# Patient Record
Sex: MALE | Race: WHITE
[De-identification: names, ages, dates, MRNs, and addresses within clinical notes are randomized per-mention and may not be internally consistent; named-entity substitution may affect disease eponyms.]

---

## 2017-11-12 ENCOUNTER — HOSPITAL ENCOUNTER (OUTPATIENT)
Dept: HOSPITAL 42 - ED | Age: 57
Setting detail: OBSERVATION
LOS: 2 days | Discharge: HOME | End: 2017-11-14
Attending: INTERNAL MEDICINE | Admitting: HOSPITALIST
Payer: COMMERCIAL

## 2017-11-12 VITALS — BODY MASS INDEX: 36.6 KG/M2

## 2017-11-12 DIAGNOSIS — Z80.0: ICD-10-CM

## 2017-11-12 DIAGNOSIS — I25.2: ICD-10-CM

## 2017-11-12 DIAGNOSIS — I10: ICD-10-CM

## 2017-11-12 DIAGNOSIS — I21.4: Primary | ICD-10-CM

## 2017-11-12 DIAGNOSIS — E11.51: ICD-10-CM

## 2017-11-12 DIAGNOSIS — T82.855A: ICD-10-CM

## 2017-11-12 DIAGNOSIS — Z91.14: ICD-10-CM
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
## 2017-11-12 DIAGNOSIS — Z87.891: ICD-10-CM

## 2017-11-12 DIAGNOSIS — I25.10: ICD-10-CM

## 2017-11-12 LAB
ALBUMIN/GLOB SERPL: 1.1 {RATIO} (ref 1.1–1.8)
ALP SERPL-CCNC: 115 U/L (ref 38–126)
ALT SERPL-CCNC: 70 U/L (ref 7–56)
AST SERPL-CCNC: 43 U/L (ref 17–59)
BASOPHILS # BLD AUTO: 0.03 K/MM3 (ref 0–2)
BASOPHILS NFR BLD: 0.3 % (ref 0–3)
BILIRUB SERPL-MCNC: 0.9 MG/DL (ref 0.2–1.3)
BUN SERPL-MCNC: 18 MG/DL (ref 7–21)
CALCIUM SERPL-MCNC: 9.4 MG/DL (ref 8.4–10.5)
CHLORIDE SERPL-SCNC: 106 MMOL/L (ref 98–107)
CO2 SERPL-SCNC: 25 MMOL/L (ref 21–33)
EOSINOPHIL # BLD: 0.3 10*3/UL (ref 0–0.7)
EOSINOPHIL NFR BLD: 2.6 % (ref 1.5–5)
ERYTHROCYTE [DISTWIDTH] IN BLOOD BY AUTOMATED COUNT: 13.5 % (ref 11.5–14.5)
GLOBULIN SER-MCNC: 3.9 GM/DL
GLUCOSE SERPL-MCNC: 145 MG/DL (ref 70–110)
GRANULOCYTES # BLD: 5.26 10*3/UL (ref 1.4–6.5)
GRANULOCYTES NFR BLD: 55.2 % (ref 50–68)
HCT VFR BLD CALC: 46.8 % (ref 42–52)
LIPASE SERPL-CCNC: 127 U/L (ref 23–300)
LYMPHOCYTES # BLD: 3.3 10*3/UL (ref 1.2–3.4)
LYMPHOCYTES NFR BLD AUTO: 34.1 % (ref 22–35)
MCH RBC QN AUTO: 30.2 PG (ref 25–35)
MCHC RBC AUTO-ENTMCNC: 34.2 G/DL (ref 31–37)
MCV RBC AUTO: 88.3 FL (ref 80–105)
MONOCYTES # BLD AUTO: 0.7 10*3/UL (ref 0.1–0.6)
MONOCYTES NFR BLD: 7.8 % (ref 1–6)
PLATELET # BLD: 207 10^3/UL (ref 120–450)
PMV BLD AUTO: 9.5 FL (ref 7–11)
POTASSIUM SERPL-SCNC: 4.2 MMOL/L (ref 3.6–5)
PROT SERPL-MCNC: 8.1 G/DL (ref 5.8–8.3)
SODIUM SERPL-SCNC: 141 MMOL/L (ref 132–148)
WBC # BLD AUTO: 9.5 10^3/UL (ref 4.5–11)

## 2017-11-12 PROCEDURE — 83615 LACTATE (LD) (LDH) ENZYME: CPT

## 2017-11-12 PROCEDURE — 93005 ELECTROCARDIOGRAM TRACING: CPT

## 2017-11-12 PROCEDURE — 90471 IMMUNIZATION ADMIN: CPT

## 2017-11-12 PROCEDURE — 84484 ASSAY OF TROPONIN QUANT: CPT

## 2017-11-12 PROCEDURE — 82553 CREATINE MB FRACTION: CPT

## 2017-11-12 PROCEDURE — 83735 ASSAY OF MAGNESIUM: CPT

## 2017-11-12 PROCEDURE — 82550 ASSAY OF CK (CPK): CPT

## 2017-11-12 PROCEDURE — 83036 HEMOGLOBIN GLYCOSYLATED A1C: CPT

## 2017-11-12 PROCEDURE — 83690 ASSAY OF LIPASE: CPT

## 2017-11-12 PROCEDURE — 93306 TTE W/DOPPLER COMPLETE: CPT

## 2017-11-12 PROCEDURE — 85025 COMPLETE CBC W/AUTO DIFF WBC: CPT

## 2017-11-12 PROCEDURE — 36415 COLL VENOUS BLD VENIPUNCTURE: CPT

## 2017-11-12 PROCEDURE — 80061 LIPID PANEL: CPT

## 2017-11-12 PROCEDURE — 83880 ASSAY OF NATRIURETIC PEPTIDE: CPT

## 2017-11-12 PROCEDURE — 85175 BLOOD CLOT LYSIS TIME: CPT

## 2017-11-12 PROCEDURE — 84443 ASSAY THYROID STIM HORMONE: CPT

## 2017-11-12 PROCEDURE — 84100 ASSAY OF PHOSPHORUS: CPT

## 2017-11-12 PROCEDURE — 90674 CCIIV4 VAC NO PRSV 0.5 ML IM: CPT

## 2017-11-12 PROCEDURE — 85730 THROMBOPLASTIN TIME PARTIAL: CPT

## 2017-11-12 PROCEDURE — 71010: CPT

## 2017-11-12 PROCEDURE — 99285 EMERGENCY DEPT VISIT HI MDM: CPT

## 2017-11-12 PROCEDURE — 99153 MOD SED SAME PHYS/QHP EA: CPT

## 2017-11-12 PROCEDURE — 99152 MOD SED SAME PHYS/QHP 5/>YRS: CPT

## 2017-11-12 PROCEDURE — 93458 L HRT ARTERY/VENTRICLE ANGIO: CPT

## 2017-11-12 PROCEDURE — 80053 COMPREHEN METABOLIC PANEL: CPT

## 2017-11-12 PROCEDURE — 85610 PROTHROMBIN TIME: CPT

## 2017-11-12 NOTE — ED PDOC
Arrival/HPI





- General


Historian: Patient


EM Caveat: Acuity of Condition





- History of Present Illness


Time/Duration: Prior to Arrival, 1-3 hours


Symptom Onset: Gradual


Symptom Course: Unchanged


Quality: Aching, Pressure


Severity Level: 8


Activities at Onset: Light


Context: Exertion





<Darlyn Ivan - Last Filed: 11/13/17 00:50>





<Sharda Rubalcava - Last Filed: 11/13/17 01:39>





- General


Chief Complaint: Chest Pain


Time Seen by Provider: 11/12/17 22:51





- History of Present Illness


Narrative History of Present Illness (Text): 





11/12/17 23:35


57 years old male with hx of CAD s/p 2 stents (2015), DM, noncompliant with meds

, presents for constant midsternal chest pain x past 3 hours. Pt states he has 

been having intermittent cp for past 2 weeks. He describes it as pressure, 

lasting for few mins at a time, rates it as 8/10, associated with exertional 

activities like bicycling, has radiation to right jaw, and associated sob and 

diaphoresis. Denies trauma, abdominal pain, nausea, vomiting, palpitations, 

syncope, cough, headache, fever, chills, diarrhea, constipation, urinary 

symptoms, claudication. Pt has no PCP and has been noncompliant with his meds 

for over a year since he cannot afford them. 


 (Darlyn Ivan)


Associated Symptoms (Text): 





11/12/17 23:51


Denies trauma, abdominal pain, nausea, vomiting, palpitations, syncope, cough, 

headache, fever, chills, diarrhea, constipation, urinary symptoms, claudication.

 (Darlyn Ivan)





Past Medical History





- Provider Review


Nursing Documentation Reviewed: Yes





- Infectious Disease


Hx of Infectious Diseases: None





- Cardiac


Hx Hypertension: Yes


Hx Peripheral Vascular Disease: Yes


Other/Comment: coronary stent





- Pulmonary


Hx Respiratory Disorders: No





- Neurological


Hx Neurological Disorder: No





- HEENT


Hx HEENT Disorder: No





- Renal


Hx Renal Disorder: No





- Endocrine/Metabolic


Hx Endocrine Disorders: No


Hx Diabetes Mellitus Type 2: No





- Hematological/Oncological


Hx Blood Transfusions: No


Hx Blood Transfusion Reaction: No





- Integumentary


Hx Dermatological Disorder: No





- Musculoskeletal/Rheumatological


Hx Arthritis: Yes





- Gastrointestinal


Hx Gastrointestinal Disorders: No





- Genitourinary/Gynecological


Hx Genitourinary Disorders: No





- Psychiatric


Hx Psychophysiologic Disorder: No


Hx Substance Use: No





- Surgical History


Hx Cardiac Catheterization: Yes


Other/Comment: coronary stent





- Anesthesia


Hx Anesthesia: Yes


Hx Anesthesia Reactions: No


Hx Malignant Hyperthermia: No





- Suicidal Assessment


Feels Threatened In Home Enviroment: No





<Darlyn Ivan - Last Filed: 11/13/17 00:50>





Family/Social History





- Physician Review


Nursing Documentation Reviewed: Yes


Family/Social History: No Known Family HX


Smoking Status: Former Smoker


Hx Alcohol Use: Yes (social)


Frequency of alcohol use: Socially


Hx Substance Use: No





<Bautista Ivanoja - Last Filed: 11/13/17 00:50>





Allergies/Home Meds





<MarzenaDarlyn - Last Filed: 11/13/17 00:50>





<GiniSharda - Last Filed: 11/13/17 01:39>


Allergies/Adverse Reactions: 


Allergies





No Known Allergies Allergy (Verified 11/13/17 01:18)


 











Review of Systems





- Review of Systems


Constitutional: absent: Fatigue, Fevers


Eyes: absent: Vision Changes


ENT: absent: Hearing Changes


Respiratory: SOB.  absent: Cough


Cardiovascular: Chest Pain.  absent: Palpitations, Calf Pain, Syncope


Gastrointestinal: absent: Abdominal Pain, Constipation, Diarrhea, Nausea, 

Vomiting


Genitourinary Male: absent: Dysuria, Hematuria


Musculoskeletal: absent: Back Pain


Skin: absent: Rash


Neurological: absent: Headache, Dizziness


Endocrine: absent: Diaphoresis


Psychiatric: Anxiety





<Darlyn Ivan - Last Filed: 11/13/17 00:50>





Physical Exam


Vital Signs Reviewed: Yes


Temperature: Afebrile


Blood Pressure: Hypertensive


Pulse: Tachycardic


Respiratory Rate: Normal


Appearance: Positive for: Uncomfortable, Other (Anxious middle aged male)


Pain Distress: Moderate


Mental Status: Positive for: Alert and Oriented X 3





- Systems Exam


Head: Present: Atraumatic, Normocephalic


Pupils: Present: PERRL


Extroacular Muscles: Present: EOMI


Conjunctiva: Present: Normal


Mouth: Present: Moist Mucous Membranes


Pharnyx: No: ERYTHEMA


Respiratory/Chest: Present: Clear to Auscultation.  No: Respiratory Distress


Cardiovascular: Present: Normal S1, S2, Tachycardic


Abdomen: Present: Normal Bowel Sounds.  No: Tenderness, Distention


Upper Extremity: Present: Normal Inspection.  No: Edema


Lower Extremity: No: Edema, CALF TENDERNESS


Neurological: Present: GCS=15, Speech Normal


Skin: Present: Warm, Dry


Psychiatric: Present: Alert, Oriented x 3





<Darlyn Ivan - Last Filed: 11/13/17 00:50>


Vital Signs











  Temp Pulse Resp BP Pulse Ox


 


 11/12/17 23:55  98 F  87  16  128/74  100


 


 11/12/17 23:53   89   112/88 


 


 11/12/17 22:50  98.3 F  98 H  18  138/102 H  97














Medical Decision Making





- Lab Interpretations


I have reviewed the lab results: Yes


Interpretation: Abnormal lab values





- RAD Interpretation


: ED Physician





- EKG Interpretation


Interpreted by ED Physician: Yes


Type: 12 lead EKG


Comparison: Com.w/previous EKG





- Transfer of Care


Patient signed out to Dr:: Brii





<Darlyn Ivan - Last Filed: 11/13/17 00:50>





<Sharda Rubalcava - Last Filed: 11/13/17 01:39>


ED Course and Treatment: 





11/13/17 00:06


57 years old male with PMH CAD s/p 2 stents (2015), DM, noncompliant with meds, 

presents for chest pain:





- cardiac iso, ekg, cbc, cmp, lipase, mg, coags


- CXR


- ASA, lipitor, lopressor, nitrostat sublingual


- reassess





11/13/17 00:43


trop 0.03, ck mb 5.1, discussed with Dr Teresa. Will go to Telemetry for 

Observation. (Darlyn Ivan)


In agreement with resident note, which includes further HPI details. Patient 

was seen and evaluated with resident, came up with plan and treatment together.





11/13/17 01:35


Patient with noted history.  EKG is nondiagnostic and does not fit code heart 

criteria; first CE is 0.03; pain improved with nitro.  Patient with history of 

cad with stents so certainly high risk for acs - will need further evaluation 

and treatment and placement on tele.  Resident discussed with Dr. Teresa. (Sharda Rubalcava)





- Lab Interpretations


Narrative Lab Interpretation (Text): 





11/13/17 00:22


trop 0.03, CK MB 5.1, ALT 70, ,  (Darlyn Ivan)


Lab Results: 








 11/12/17 22:45 





 11/12/17 22:45 





 Lab Results





11/12/17 22:45: PT 11.9, INR 1.08, APTT 28.9


11/12/17 22:45: Sodium 141, Potassium 4.2, Chloride 106, Carbon Dioxide 25, 

Anion Gap 14, BUN 18, Creatinine 1.0, Est GFR (African Amer) > 60, Est GFR (Non-

Af Amer) > 60, Random Glucose 145 H, Calcium 9.4, Total Bilirubin 0.9, AST 43, 

ALT 70 H, Alkaline Phosphatase 115, Lactate Dehydrogenase 776 H, Total Creatine 

Kinase 292 H, CK-MB (CK-2) 5.1 H, CK-MB (CK-2) % 1.7 L, Troponin I 0.03  D, NT-

Pro-B Natriuret Pep 122, Total Protein 8.1, Albumin 4.2, Globulin 3.9, Albumin/

Globulin Ratio 1.1, Lipase 127


11/12/17 22:45: WBC 9.5  D, RBC 5.30, Hgb 16.0, Hct 46.8, MCV 88.3, MCH 30.2, 

MCHC 34.2, RDW 13.5, Plt Count 207, MPV 9.5, Gran % 55.2, Lymph % (Auto) 34.1, 

Mono % (Auto) 7.8 H, Eos % (Auto) 2.6, Baso % (Auto) 0.3, Gran # 5.26, Lymph # 

3.3, Mono # 0.7 H, Eos # 0.3, Baso # 0.03











- RAD Interpretation


Narrative RAD Interpretations (Text): 





11/13/17 00:42


CXR: no acute changes (Darlyn Ivan)


Radiology Orders: 








11/12/17 23:28


CHEST PORTABLE [RAD] Stat 














- EKG Interpretation


EKG Interpretation (Text): 





11/13/17 00:14


EKG: , No acute ST changes (Darlyn Ivan)





- Medication Orders


Current Medication Orders: 








Aspirin (Ecotrin)  81 mg PO DAILY TJ


Atorvastatin Calcium (Lipitor)  80 mg PO DAILY [facility]


Metoprolol Tartrate (Lopressor)  25 mg PO BID TJ





Discontinued Medications





Aspirin (Aspirin)  325 mg PO STAT STA


   Stop: 11/12/17 23:29


   Last Admin: 11/12/17 23:53  Dose: 325 mg





Atorvastatin Calcium (Lipitor)  40 mg PO STAT STA


   Stop: 11/12/17 23:32


   Last Admin: 11/12/17 23:53  Dose: 40 mg





Metoprolol Tartrate (Lopressor)  25 mg PO STAT STA


   Stop: 11/12/17 23:42


   Last Admin: 11/12/17 23:53  Dose: 25 mg





MAR Pulse and Blood Pressure


 Document     11/12/17 23:53  LAC  (Rec: 11/12/17 23:53  LAC  [facility] [place]-[id number])


     Pulse


      Pulse Rate (60-90)                         89


     Blood Pressure


      Blood Pressure (100//90)             112/88





Nitroglycerin (Nitrostat Sl Tab)  0.4 mg SL STAT STA


   Stop: 11/12/17 23:43


   Last Admin: 11/12/17 23:53  Dose: 0.4 mg











- PA / NP / Resident Statement


MD/DO has reviewed & agrees with the documentation as recorded.


MD/DO has examined the patient and agrees with the treatment plan.





<Darlyn Ivan - Last Filed: 11/13/17 00:50>





- PA / NP / Resident Statement


MD/ has reviewed & agrees with the documentation as recorded.


MD/DO has examined the patient and agrees with the treatment plan.





- Scribe Statement


The provider has reviewed the documentation as recorded by the Scribe





<Sharda Rubalcava - Last Filed: 11/13/17 01:39>





- Scribe Statement


Elmira Palomares





Provider Scribe Attestation:


All medical record entries made by the Scribe were at my direction and 

personally dictated by me. I have reviewed the chart and agree that the record 

accurately reflects my personal performance of the history, physical exam, 

medical decision making, and the department course for this patient. I have 

also personally directed, reviewed, and agree with the discharge instructions 

and disposition. (Sharda Rubalcava)





Disposition/Present on Arrival





- Present on Arrival


Any Indicators Present on Arrival: No


History of DVT/PE: No


History of Uncontrolled Diabetes: No


Urinary Catheter: No


History of Decub. Ulcer: No


History Surgical Site Infection Following: None





- Disposition


Have Diagnosis and Disposition been Completed?: Yes


Disposition Time: 00:43


Patient Plan: Observation, Telemetry





<Darlyn Ivan - Last Filed: 11/13/17 00:50>





<Sharda Rubalcava - Last Filed: 11/13/17 01:39>





- Disposition


Diagnosis: 


 Chest pain





Disposition: HOSPITALIZED


Patient Problems: 


 Current Active Problems











Problem Status Onset


 


Chest pain Acute  











Condition: FAIR

## 2017-11-13 VITALS — OXYGEN SATURATION: 100 %

## 2017-11-13 LAB
ALBUMIN/GLOB SERPL: 1 {RATIO} (ref 1.1–1.8)
ALP SERPL-CCNC: 111 U/L (ref 38–126)
ALT SERPL-CCNC: 67 U/L (ref 7–56)
APTT BLD: 28.9 SECONDS (ref 25.1–36.5)
AST SERPL-CCNC: 50 U/L (ref 17–59)
BASOPHILS # BLD AUTO: 0.03 K/MM3 (ref 0–2)
BASOPHILS NFR BLD: 0.4 % (ref 0–3)
BILIRUB SERPL-MCNC: 1 MG/DL (ref 0.2–1.3)
BUN SERPL-MCNC: 17 MG/DL (ref 7–21)
CALCIUM SERPL-MCNC: 8.9 MG/DL (ref 8.4–10.5)
CHLORIDE SERPL-SCNC: 106 MMOL/L (ref 98–107)
CHOLEST SERPL-MCNC: 158 MG/DL (ref 130–200)
CO2 SERPL-SCNC: 24 MMOL/L (ref 21–33)
EOSINOPHIL # BLD: 0.3 10*3/UL (ref 0–0.7)
EOSINOPHIL NFR BLD: 3.2 % (ref 1.5–5)
ERYTHROCYTE [DISTWIDTH] IN BLOOD BY AUTOMATED COUNT: 13.8 % (ref 11.5–14.5)
GLOBULIN SER-MCNC: 3.7 GM/DL
GLUCOSE SERPL-MCNC: 169 MG/DL (ref 70–110)
GRANULOCYTES # BLD: 3.95 10*3/UL (ref 1.4–6.5)
GRANULOCYTES NFR BLD: 51.1 % (ref 50–68)
HCT VFR BLD CALC: 44.7 % (ref 42–52)
INR PPP: 1.08 (ref 0.93–1.08)
LYMPHOCYTES # BLD: 2.8 10*3/UL (ref 1.2–3.4)
LYMPHOCYTES NFR BLD AUTO: 36.1 % (ref 22–35)
MAGNESIUM SERPL-MCNC: 1.9 MG/DL (ref 1.7–2.2)
MCH RBC QN AUTO: 29.3 PG (ref 25–35)
MCHC RBC AUTO-ENTMCNC: 32.7 G/DL (ref 31–37)
MCV RBC AUTO: 89.8 FL (ref 80–105)
MONOCYTES # BLD AUTO: 0.7 10*3/UL (ref 0.1–0.6)
MONOCYTES NFR BLD: 9.2 % (ref 1–6)
PHOSPHATE SERPL-MCNC: 2.9 MG/DL (ref 2.5–4.5)
PLATELET # BLD: 199 10^3/UL (ref 120–450)
PMV BLD AUTO: 10.2 FL (ref 7–11)
POTASSIUM SERPL-SCNC: 4.2 MMOL/L (ref 3.6–5)
PROT SERPL-MCNC: 7.5 G/DL (ref 5.8–8.3)
SODIUM SERPL-SCNC: 139 MMOL/L (ref 132–148)
TROPONIN I SERPL-MCNC: 0.03 NG/ML
TROPONIN I SERPL-MCNC: 1.11 NG/ML
TROPONIN I SERPL-MCNC: 1.58 NG/ML
WBC # BLD AUTO: 7.7 10^3/UL (ref 4.5–11)

## 2017-11-13 RX ADMIN — PANTOPRAZOLE SODIUM SCH MG: 40 TABLET, DELAYED RELEASE ORAL at 05:53

## 2017-11-13 NOTE — CARD
--------------- APPROVED REPORT --------------





EKG Measurement

Heart [id number]

[place] 160P15

[id number]

[id number]

[id number]



<Conclusion>

Sinus bradycardia

Inferior infarct, age undetermined

Abnormal ECG

## 2017-11-13 NOTE — CP.PCM.HP
History of Present Illness





- History of Present Illness


History of Present Illness: 


57-year-old male with past medical history of coronary artery disease with two 

stents, questionable diabetes and noncompliance with medications presents to 

the emergency room with chest pain. Patient states he was biking home from work 

around 8:30 PM today when he started feeling tightness in his chest. He 

describes the pain as 8 out of 10 which did not improve with rest. He stated 

the pain got worse when he got home and had to come into the hospital. Patient 

states the pain was located in the middle of his chest and also radiated to his 

left arm, jaw and mouth. Along with the pain, patient states that he began to 

sweat profusely. Patient did not take any medications to alleviate the pain. 

Patient states that the pain has decreased since since receiving pain 

medication in the emergency room. Patient denies any shortness of breath ,

nausea ,vomiting, abdominal pain, fevers,chills, or any other complaints. 





Primary Medical Doctor: none 


Past medical history: coronary artery disease status post two stents 


Past surgical history: varicose veins surgery


Allergies: denies 


Family History: DM and mother had gastric cancer 


Medications: none


Social: denies tobacco use. Consumes 6-10 beers a week, denies any illicit drug 

use 








Present on Admission





- Present on Admission


Any Indicators Present on Admission: No





Review of Systems





- Constitutional


Constitutional: Excessive Sweating.  absent: Chills, Fever





- EENT


Eyes: absent: Blurred Vision, Change in Vision


Ears: absent: Disequilibrium, Dizziness


Nose/Mouth/Throat: absent: Nasal Congestion, Nasal Discharge





- Cardiovascular


Cardiovascular: Chest Pain, Chest Pain with Activity, Pain Radiating to Arm/Neck

/Jaw, Radiating Pain.  absent: Dyspnea, Lightheadedness, Rapid Heart Rate, Slow 

Heart Rate





- Respiratory


Respiratory: absent: Cough, Dyspnea





- Gastrointestinal


Gastrointestinal: absent: Abdominal Pain, Constipation, Diarrhea, Nausea, 

Vomiting





- Genitourinary


Genitourinary: absent: Change in Urinary Stream, Difficulty Urinating





- Musculoskeletal


Musculoskeletal: absent: Arthralgias, Numbness, Tingling





- Integumentary


Integumentary: absent: Rash





- Neurological


Neurological: absent: Disequilibrium, Dizziness, Headaches, Syncope





Past Patient History





- Infectious Disease


Hx of Infectious Diseases: None





- Past Social History


Smoking Status: Former Smoker





- CARDIAC


Hx Hypertension: Yes


Hx Peripheral Vascular Disease: Yes


Other/Comment: coronary stent





- PULMONARY


Hx Respiratory Disorders: No





- NEUROLOGICAL


Hx Neurological Disorder: No





- HEENT


Hx HEENT Problems: No





- RENAL


Hx Chronic Kidney Disease: No





- ENDOCRINE/METABOLIC


Hx Endocrine Disorders: No


Hx Diabetes Mellitus Type 2: No





- HEMATOLOGICAL/ONCOLOGICAL


Hx Blood Transfusions: No


Hx Blood Transfusion Reaction: No





- INTEGUMENTARY


Hx Dermatological Problems: No





- MUSCULOSKELETAL/RHEUMATOLOGICAL


Hx Arthritis: Yes





- GASTROINTESTINAL


Hx Gastrointestinal Disorders: No





- GENITOURINARY/GYNECOLOGICAL


Hx Genitourinary Disorders: No





- PSYCHIATRIC


Hx Psychophysiologic Disorder: No


Hx Substance Use: No





- SURGICAL HISTORY


Hx Cardiac Catheterization: Yes


Other/Comment: coronary stent





- ANESTHESIA


Hx Anesthesia: Yes


Hx Anesthesia Reactions: No


Hx Malignant Hyperthermia: No





Meds


Allergies/Adverse Reactions: 


 Allergies











Allergy/AdvReac Type Severity Reaction Status Date / Time


 


No Known Allergies Allergy   Verified 11/13/17 01:18














Physical Exam





- Constitutional


Appears: Non-toxic, No Acute Distress





- Head Exam


Head Exam: ATRAUMATIC, NORMAL INSPECTION, NORMOCEPHALIC





- Eye Exam


Eye Exam: EOMI, Normal appearance


Pupil Exam: NORMAL ACCOMODATION, PERRL





- ENT Exam


ENT Exam: Mucous Membranes Moist, Normal Exam





- Neck Exam


Neck exam: Positive for: Normal Inspection





- Respiratory Exam


Respiratory Exam: Clear to Auscultation Bilateral, NORMAL BREATHING PATTERN





- Cardiovascular Exam


Cardiovascular Exam: REGULAR RHYTHM, +S1





- GI/Abdominal Exam


GI & Abdominal Exam: Normal Bowel Sounds





- Extremities Exam


Extremities exam: Positive for: normal inspection, pedal pulses present.  

Negative for: pedal edema, tenderness


Additional comments: 


veins prominent in lower extremeties, with signs of peripheral arty disease








- Neurological Exam


Neurological exam: Alert, Oriented x3





- Psychiatric Exam


Psychiatric exam: Normal Affect





- Skin


Skin Exam: Normal Color, Warm





Results





- Vital Signs


Recent Vital Signs: 





 Last Vital Signs











Temp  98 F   11/12/17 23:55


 


Pulse  87   11/12/17 23:55


 


Resp  16   11/12/17 23:55


 


BP  128/74   11/12/17 23:55


 


Pulse Ox  100   11/12/17 23:55














- Labs


Result Diagrams: 


 11/12/17 22:45





 11/12/17 22:45





Assessment & Plan





- Assessment and Plan (Free Text)


Assessment: 


57-year-old male with past medical history of coronary artery disease with two 

stents, questionable diabetes and noncompliance with medications presents to 

the emergency room with chest pain. 





Plan: 


1. Chest Pain- Rule out ACS


-EKG ordered and obtained, no ST changes, pending official read


-will obtain EKG in AM


-chest xray : no active disease, pending official read 


-CBC and CMP ordered and reviewed 


-intial tops: .03, will monitor serially with 2 more sets 


-CK: 292, CK-MB 5.1, will monitor 


-LDH: 776 


-Lipitor 80 mg 


-Aspirin 81mg


-Metoprolol 25 BID, hold if SBP <110 or HR< 55


-Nitroglycerin SL PRN


-Hemoglobin A1C pending


-TSH pending 


-Lipid Panel Pending


-cardio consulted, Denikinlisa, will follow recs





2. CAD


-Lipd panel pendiing


-Lipitor 40mg





3. Diabetes-questionable


-Hemoglobin A1C pending 





4. GI/DVT Prophylaxis 


-Loveneox SC


-Protonix

## 2017-11-13 NOTE — CON
DATE:  11/13/2017



INDICATIONS:  Chest pain and positive troponins.



HISTORY OF PRESENT ILLNESS:  This is a 57-year-old man admitted through the

emergency room yesterday with chest pain, neck pain and tightness in the

chest.  The pain lasted for several hours and then he came into the

hospital when it was getting worse.  Sometimes the pain was felt in the

jaw.  There was no shortness of breath, orthopnea, PND, syncope,

presyncope, lightheadedness, dizziness, vertigo, palpitations, edema,

claudication, fever, chills, cough, sputum production, hemoptysis,

abdominal pain, nausea, vomiting, diarrhea, constipation, or melena.



PAST MEDICAL HISTORY:  Notable for coronary artery disease.  He suffered

an acute inferior wall myocardial infarction in 04/2015, at which time he

was a code heart patient.  The right coronary artery was occluded and was

stented urgently. Two days later the LAD was stented as well. 

Subsequently, he did not have a cardiac or medical followup.  He states

that he took meds for about a year and then stopped them about a year ago

because he is unable to afford medications or followup.  There is a history

of diabetes.  He is a former smoker.  The is no history of rheumatic fever,

congestive heart failure, stroke, TIA, hypertension, hyperlipidemia, or

gout.



MEDICATIONS AT THE TIME OF ADMISSION:  None.  Subsequently, he received

aspirin, Plavix, Lipitor metoprolol, Lovenox, Protonix.



ALLERGIES:  THERE WERE NO MEDICATION ALLERGIES REPORTED.



SOCIAL HISTORY:  He lives at home with his family.  He does not smoke.  He

drinks beer almost daily.



FAMILY HISTORY:  Noncontributory for coronary artery disease.



REVIEW OF SYSTEMS:  A 10-pont review of systems otherwise unremarkable. 

This history was obtained with the assistance of a nurse, who spoke [place]

and interpreted for us.



PHYSICAL EXAMINATION:

GENERAL:  He is a well-developed male, lying flat in bed, on telemetry. 

His wife is present.

VITAL SIGNS:  Unremarkable.  He is in sinus rhythm at 57 beats per minute. 

He is afebrile.  Blood pressure 113/56, respirations 18 to 20 and O2 sat

97% to 100% on room air.

HEENT:  Reveals no neck pain, distension, thyromegaly or carotid bruits. 

Mucous membranes are moist.  Conjunctiva pink.

NECK:  Supple.

LUNGS:  Fields clear.

HEART:  Revealed normal first and second heart sounds.  I did not

appreciate murmur, gallop, rub or click.

ABDOMEN:  Soft.  Bowel sounds present.  No mass, organomegaly, tenderness,

rebound, guarding, CVA tenderness, palpable abdominal aortic aneurysm.

EXTREMITIES:  Revealed no cyanosis, clubbing or edema.

NEUROLOGIC:  He was awake, alert and oriented.

SKIN:  Warm and dry.  No rash or cellulitis.

PSYCHIATRIC:  Normal as to mood and affect.



LABORATORY AND IMAGING:  An EKG demonstrates regular sinus rhythm, there

was a peaked T-wave in V2.  There were T-wave inversion inferiorly with an

old inferior wall myocardial infarction.  Laterally, the T-waves are

flattened as compared to a prior EKG.  Portable chest x-ray reveals no

active disease.  CBC is unremarkable.  PT/INR, PTT unremarkable. 

Electrolytes, BUN and creatinine unremarkable.  Blood sugar 145, LFTs

mildly abnormal.  CK elevated at 292.  First troponin 0.03, second troponin

1.11.  BNP is 122.  Total cholesterol is 158, , triglycerides 76,

HDL 35, lipase 127 and TSH 2.8.



IMPRESSION:  The patient is a 57-year-old male, former smoker with known

coronary artery disease, status post acute inferior wall myocardial

infarction on 04/2015, at which time he received right coronary stent for

an occluded right coronary and then left anterior descending stent for a

moderate left anterior descending lesion.  He has not had medical followup

and stopped medication at least a year ago.



PLAN:  At this time he presents with typical cardiac symptoms and he has

developed a positive troponin.  I have recommended cardiac catheterization

which we will arrange for later today.  In the meantime, he is getting

aspirin.  He received Plavix 300 mg, Lipitor, metoprolol, and Protonix.  I

will order echocardiogram.  We will keep him n.p.o. after clear liquid

breakfast.  Tentatively cardiac catheterization is scheduled for later this

afternoon.  He will stay at bedrest.  We will monitor I's and O's.  We will

check stool for occult blood.  I have reviewed his prior records, I have

discussed the case with Dr. Garcia.  Additional recommendations will be

based on his catheterization data.







__________________________________________

Julian Brush MD





DD:  11/13/2017 8:47:33

DT:  11/13/2017 9:53:29

Job # [id number]

RODRICK

## 2017-11-13 NOTE — CARD
--------------- APPROVED REPORT --------------





EKG Measurement

Heart [id number]

[place] 156P19

[id number]

[id number]

[id number]



<Conclusion>

Sinus bradycardia

Possible Inferior infarct, age undetermined

Abnormal ECG

## 2017-11-13 NOTE — CARD
--------------- APPROVED REPORT --------------





EKG Measurement

Heart [id number]

[place] 140P60

[id number]

[id number]

[id number]



<Conclusion>

Sinus tachycardia

Inferior infarct, age undetermined

Abnormal ECG

## 2017-11-14 VITALS — HEART RATE: 101 BPM | SYSTOLIC BLOOD PRESSURE: 138 MMHG | DIASTOLIC BLOOD PRESSURE: 78 MMHG

## 2017-11-14 VITALS — RESPIRATION RATE: 18 BRPM | TEMPERATURE: 98 F

## 2017-11-14 LAB
ALBUMIN/GLOB SERPL: 1 {RATIO} (ref 1.1–1.8)
ALP SERPL-CCNC: 107 U/L (ref 38–126)
ALT SERPL-CCNC: 70 U/L (ref 7–56)
AST SERPL-CCNC: 47 U/L (ref 17–59)
BASOPHILS # BLD AUTO: 0.03 K/MM3 (ref 0–2)
BASOPHILS NFR BLD: 0.4 % (ref 0–3)
BILIRUB SERPL-MCNC: 1.3 MG/DL (ref 0.2–1.3)
BUN SERPL-MCNC: 14 MG/DL (ref 7–21)
CALCIUM SERPL-MCNC: 8.5 MG/DL (ref 8.4–10.5)
CHLORIDE SERPL-SCNC: 107 MMOL/L (ref 98–107)
CO2 SERPL-SCNC: 26 MMOL/L (ref 21–33)
EOSINOPHIL # BLD: 0.3 10*3/UL (ref 0–0.7)
EOSINOPHIL NFR BLD: 3.4 % (ref 1.5–5)
ERYTHROCYTE [DISTWIDTH] IN BLOOD BY AUTOMATED COUNT: 13.6 % (ref 11.5–14.5)
GLOBULIN SER-MCNC: 3.5 GM/DL
GLUCOSE SERPL-MCNC: 94 MG/DL (ref 70–110)
GRANULOCYTES # BLD: 5.18 10*3/UL (ref 1.4–6.5)
GRANULOCYTES NFR BLD: 65.7 % (ref 50–68)
HCT VFR BLD CALC: 45.4 % (ref 42–52)
LYMPHOCYTES # BLD: 1.8 10*3/UL (ref 1.2–3.4)
LYMPHOCYTES NFR BLD AUTO: 22.5 % (ref 22–35)
MAGNESIUM SERPL-MCNC: 1.9 MG/DL (ref 1.7–2.2)
MCH RBC QN AUTO: 29.1 PG (ref 25–35)
MCHC RBC AUTO-ENTMCNC: 32.6 G/DL (ref 31–37)
MCV RBC AUTO: 89.2 FL (ref 80–105)
MONOCYTES # BLD AUTO: 0.6 10*3/UL (ref 0.1–0.6)
MONOCYTES NFR BLD: 8 % (ref 1–6)
PHOSPHATE SERPL-MCNC: 3.2 MG/DL (ref 2.5–4.5)
PLATELET # BLD: 177 10^3/UL (ref 120–450)
PMV BLD AUTO: 9.6 FL (ref 7–11)
POTASSIUM SERPL-SCNC: 4.4 MMOL/L (ref 3.6–5)
PROT SERPL-MCNC: 7 G/DL (ref 5.8–8.3)
SODIUM SERPL-SCNC: 137 MMOL/L (ref 132–148)
TROPONIN I SERPL-MCNC: 1.12 NG/ML
WBC # BLD AUTO: 7.9 10^3/UL (ref 4.5–11)

## 2017-11-14 RX ADMIN — PANTOPRAZOLE SODIUM SCH MG: 40 TABLET, DELAYED RELEASE ORAL at 05:30

## 2017-11-14 NOTE — CP.PCM.PN
Subjective





- Date & Time of Evaluation


Date of Evaluation: 11/14/17


Time of Evaluation: 07:00





- Subjective


Subjective: 





Stable on 2R. S/P cath/PCI LAD ISR with BHUMIKA. Titi. well. No CP or SOB.





V/S noted.





PE:





Lungs: clear


Cor.: S1S2


Abd.: soft


Ext.: no edema


Neuro.; alert


Groin OK





I/O= 900/650





Labs noted. trop 1.12











Objective





- Vital Signs/Intake and Output


Vital Signs (last 24 hours): 


 











Temp Pulse Resp BP Pulse Ox


 


 98.0 F   57 L  18   110/63   100 


 


 11/14/17 05:58  11/14/17 06:03  11/14/17 05:58  11/14/17 05:58  11/13/17 06:00








Intake and Output: 


 











 11/14/17 11/14/17





 06:59 18:59


 


Intake Total 900 


 


Output Total 650 


 


Balance 250 














- Medications


Medications: 


 Current Medications





Acetaminophen (Tylenol 325mg Tab)  650 mg PO Q4H PRN


   PRN Reason: Pain, moderate (4-7)


Alprazolam (Xanax)  0.25 mg PO BID PRN


   PRN Reason: Anxiety


   Stop: 11/20/17 16:54


Aspirin (Ecotrin)  81 mg PO DAILY [facility]


   Last Admin: 11/13/17 09:17 Dose:  81 mg


Atorvastatin Calcium (Lipitor)  80 mg PO DAILY [facility]


   Last Admin: 11/13/17 09:17 Dose:  80 mg


Clopidogrel Bisulfate (Plavix)  75 mg PO DAILY [facility]


Docusate Sodium (Colace)  100 mg PO BID [facility]


   Last Admin: 11/13/17 18:40 Dose:  Not Given


Metoprolol Tartrate (Lopressor)  25 mg PO BID [facility]


   Last Admin: 11/13/17 18:41 Dose:  Not Given


Nitroglycerin (Nitrostat Sl Tab)  0.4 mg SL PRN PRN


   PRN Reason: Other


Ondansetron HCl (Zofran Inj)  4 mg IV ONCE PRN


   PRN Reason: Nausea/Vomiting


Pantoprazole Sodium (Protonix Ec Tab)  40 mg PO 0600 [facility]


   Last Admin: 11/14/17 05:30 Dose:  40 mg


Zolpidem Tartrate (Ambien)  5 mg PO HS PRN


   PRN Reason: Insomnia











- Labs


Labs: 


 





 11/14/17 06:30 





 11/14/17 06:30 





 











PT  11.9 SECONDS (9.4-12.5)   11/12/17  22:45    


 


INR  1.08  (0.93-1.08)   11/12/17  22:45    


 


APTT  28.9 Seconds (25.1-36.5)   11/12/17  22:45    














Assessment and Plan





- Assessment and Plan (Free Text)


Assessment: 





Chest, Neck, Back and Jaw Pain/NSTEMI


PCI LAD ISR with BHUMIKA 11/13/17


Diabetes


Former Smoker








Plan:





OOB/Ambulate this AM


Plavix daily for one year minimum.


ASA daily indefinitely


Statin, metoprolol, Ace/ARB


He needs regular medical F/U for CAD and Diabetes (has been non-compliant b/o 

financial reasons).


Home later today.

## 2017-11-14 NOTE — CARD
--------------- APPROVED REPORT --------------





EXAM: Two-dimensional and M-mode echocardiogram with Doppler and 

color Doppler.



Other Information 

Quality : FairRhythm : 



INDICATION

Chest Pain , NSTEMI, Old MI/PCIs.



2D DIMENSIONS 

Left Atrium (2D)3.6   (1.6-4.0cm)IVSd1.1   (0.7-1.1cm)

LVDd4.9   (3.9-5.9cm)PWd1.1   (0.7-1.1cm)

LVDs3.8   (2.5-4.0cm)FS (%) 17.9   %

LVEF (%)40.0   (>50%)



M-Mode DIMENSIONS 

Aortic Root3.00   (2.2-3.7cm)Aortic Cusp Exc.1.80   (1.5-2.0cm)



Aortic Valve

AoV Peak Kqupehuk053.0cm/s



Mitral Valve

MV E Hakdcjym46.0cm/sMV A Fpqhcnfe580.0cm/sE/A ratio0.9



TDI

E/Lateral E'0.0E/Medial E'0.0



Tricuspid Valve

TR Peak Vmcadwsf986jm/sRAP [id number] Peak Gr.13mmHg

[id number]



 LEFT VENTRICLE 

The left ventricle is normal size. There is normal left ventricular 

wall thickness. Left ventricle systolic function is mildly impaired. 

The Ejection Fraction is - 40-45%. There is moderate inferobasalar 

hypokinesis.



 RIGHT VENTRICLE 

The right ventricle is normal size.



 ATRIA 

The left atrium size is normal. The right atrium size is normal. The 

interatrial septum is intact with no evidence for an atrial septal 

defect.



 AORTIC VALVE 

The aortic valve is normal in structure.



 MITRAL VALVE 

The mitral valve is normal in structure.



 TRICUSPID VALVE 

The tricuspid valve is normal in structure. There is mild tricuspid 

regurgitation.



 PULMONIC VALVE 

The pulmonic valve is not well visualized.



 GREAT VESSELS 

The aortic root is normal in size.



 PERICARDIAL EFFUSION 

There is no pericardial effusion.



<Conclusion>

The left ventricle is normal size.

There is normal left ventricular wall thickness.

There is moderate inferobasalar hypokinesis.

The Ejection Fraction is - 40-45%.

## 2017-11-15 NOTE — CP.PCM.DIS
<Goldy Banuelos - Last Filed: 11/15/17 12:51>





Provider





- Provider


Date of Admission: 


11/13/17 00:44





Attending physician: 


Surendra Simmons MD





Time Spent in preparation of Discharge (in minutes): 45





Hospital Course





- Lab Results


Lab Results: 


 Most Recent Lab Values











WBC  7.9 10^3/ul (4.5-11.0)   11/14/17  06:30    


 


RBC  5.09 10^6/uL (3.5-6.1)   11/14/17  06:30    


 


Hgb  14.8 g/dL (14.0-18.0)   11/14/17  06:30    


 


Hct  45.4 % (42.0-52.0)   11/14/17  06:30    


 


MCV  89.2 fl (80.0-105.0)   11/14/17  06:30    


 


MCH  29.1 pg (25.0-35.0)   11/14/17  06:30    


 


MCHC  32.6 g/dl (31.0-37.0)   11/14/17  06:30    


 


RDW  13.6 % (11.5-14.5)   11/14/17  06:30    


 


Plt Count  177 10^3/uL (120.0-450.0)   11/14/17  06:30    


 


MPV  9.6 fl (7.0-11.0)   11/14/17  06:30    


 


Gran %  65.7 % (50.0-68.0)   11/14/17  06:30    


 


Lymph % (Auto)  22.5 % (22.0-35.0)   11/14/17  06:30    


 


Mono % (Auto)  8.0 % (1.0-6.0)  H  11/14/17  06:30    


 


Eos % (Auto)  3.4 % (1.5-5.0)   11/14/17  06:30    


 


Baso % (Auto)  0.4 % (0.0-3.0)   11/14/17  06:30    


 


Gran #  5.18  (1.4-6.5)   11/14/17  06:30    


 


Lymph #  1.8  (1.2-3.4)   11/14/17  06:30    


 


Mono #  0.6  (0.1-0.6)   11/14/17  06:30    


 


Eos #  0.3  (0.0-0.7)   11/14/17  06:30    


 


Baso #  0.03 K/mm3 (0.0-2.0)   11/14/17  06:30    


 


PT  11.9 SECONDS (9.4-12.5)   11/12/17  22:45    


 


INR  1.08  (0.93-1.08)   11/12/17  22:45    


 


APTT  28.9 Seconds (25.1-36.5)   11/12/17  22:45    


 


Sodium  137 mmol/L (132-148)   11/14/17  06:30    


 


Potassium  4.4 mmol/L (3.6-5.0)   11/14/17  06:30    


 


Chloride  107 mmol/L ()   11/14/17  06:30    


 


Carbon Dioxide  26 mmol/L (21-33)   11/14/17  06:30    


 


Anion Gap  8  (10-20)  L  11/14/17  06:30    


 


BUN  14 mg/dL (7-21)   11/14/17  06:30    


 


Creatinine  0.8 mg/dL (0.8-1.5)   11/14/17  06:30    


 


Est GFR ( Amer)  > 60   11/14/17  06:30    


 


Est GFR (Non-Af Amer)  > 60   11/14/17  06:30    


 


Random Glucose  94 mg/dL ()   11/14/17  06:30    


 


Hemoglobin A1c  6.3 % (4.2-6.5)   11/13/17  05:30    


 


Calcium  8.5 mg/dL (8.4-10.5)   11/14/17  06:30    


 


Phosphorus  3.2 mg/dL (2.5-4.5)   11/14/17  06:30    


 


Magnesium  1.9 mg/dL (1.7-2.2)   11/14/17  06:30    


 


Total Bilirubin  1.3 mg/dL (0.2-1.3)   11/14/17  06:30    


 


AST  47 U/L (17-59)   11/14/17  06:30    


 


ALT  70 U/L (7-56)  H  11/14/17  06:30    


 


Alkaline Phosphatase  107 U/L ()   11/14/17  06:30    


 


Lactate Dehydrogenase  776 U/L (333-699)  H  11/12/17  22:45    


 


Total Creatine Kinase  292 U/L ()  H  11/12/17  22:45    


 


CK-MB (CK-2)  5.1 ng/mL (0.0-3.6)  H  11/12/17  22:45    


 


CK-MB (CK-2) %  1.7 % (2.5-3.0)  L  11/12/17  22:45    


 


Troponin I  1.12 ng/mL H* D 11/14/17  06:30    


 


NT-Pro-B Natriuret Pep  122 pg/mL (0-450)   11/12/17  22:45    


 


Total Protein  7.0 g/dL (5.8-8.3)   11/14/17  06:30    


 


Albumin  3.4 g/dL (3.0-4.8)   11/14/17  06:30    


 


Globulin  3.5 gm/dL  11/14/17  06:30    


 


Albumin/Globulin Ratio  1.0  (1.1-1.8)  L  11/14/17  06:30    


 


Triglycerides  76 mg/dL ()   11/13/17  05:30    


 


Cholesterol  158 mg/dL (130-200)   11/13/17  05:30    


 


LDL Cholesterol Direct  120 mg/dL (0-129)   11/13/17  05:30    


 


HDL Cholesterol  35 mg/dL (29-60)   11/13/17  05:30    


 


Lipase  127 U/L ()   11/12/17  22:45    


 


TSH 3rd Generation  2.80 mIU/mL (0.46-4.68)   11/13/17  05:30    














- Hospital Course


Hospital Course: 





Mr. Sincere Martino is a 57-year-old male with PMH acute inferior wall MI in 04/ 2015 s/p 2 stents (1 in LAD and 1 in RCA), DM2, former smoker and noncompliance 

with medications (due to insurance issues) who presented to the emergency room 

with chest pain. Patient stated that the pain was located in the middle of his 

chest and also radiated to his left arm, jaw and mouth, and was a/w 

diaphoresis. In ED, EKG showed sinus bradycardia with questionable inferior 

infarct (age undetermined), troponin I was 0.03 and so no code heart was 

called. CXR was unremarkable. 


Patient was transferred to telemetry for monitoring. Follow-up troponin I 

trended up to 1.11 and 1.58. Echo was done and showed 40% EF and moderate 

inferobasalar hypokinesis. Cardiology was consulted and EKG remained with no ST 

elevations. Patient was prepped for catheterization for NSTEMI. Successful PCI 

of LAD in stent restenosis with BHUMIKA was carried out, and RCA stent was patent. 

Post-cath, Troponin was trending down and pain improved. Patient denies 

shortness of breath, chest pain or n/v/d. Patient was educated about strict 

medication compliance, and follow up at [facility] clinic on Wednesday 11/22/17 @ 9AM 

and was provided with card. Patient is prescribed ASA 81mg daily, Lipitor 80mg 

daily, Plavix 75mg daily, Lisinopril 2.5mg daily, Lopressor 25mg BID. 








- Date & Time of H&P


Date of H&P: 11/13/17


Time of H&P: 02:50





Discharge Exam





- Additional Findings


Additional findings: 





- Constitutional


Appears: Non-toxic, No Acute Distress





- Head Exam


Head Exam: ATRAUMATIC, NORMAL INSPECTION, NORMOCEPHALIC





- Eye Exam


Eye Exam: EOMI, Normal appearance


Pupil Exam: NORMAL ACCOMODATION, PERRL





- ENT Exam


ENT Exam: Mucous Membranes Moist, Normal Exam





- Neck Exam


Neck exam: Positive for: Normal Inspection





- Respiratory Exam


Respiratory Exam: Clear to Auscultation Bilateral, NORMAL BREATHING PATTERN





- Cardiovascular Exam


Cardiovascular Exam: REGULAR RHYTHM, +S1





- GI/Abdominal Exam


GI & Abdominal Exam: Normal Bowel Sounds





- Extremities Exam


Extremities exam: Positive for: normal inspection, pedal pulses present.  

Negative for: pedal edema, tenderness


Additional comments: 


veins prominent in lower extremeties, with signs of peripheral arty disease








- Neurological Exam


Neurological exam: Alert, Oriented x3





- Psychiatric Exam


Psychiatric exam: Normal Affect





- Skin


Skin Exam: Normal Color, Warm








Discharge Plan





- Discharge Medications


Prescriptions: 


Aspirin [Ecotrin] 81 mg PO DAILY #30 tabec


Atorvastatin [Lipitor] 80 mg PO DAILY #30 tab


Clopidogrel [Plavix] 75 mg PO DAILY #30 tab


Lisinopril [Zestril] 2.5 mg PO DAILY #30 tablet


Metoprolol Tartrate [Lopressor] 25 mg PO BID #60 tab





- Follow Up Plan


Condition: FAIR


Disposition: HOME/ ROUTINE


Instructions:  Myocardial Infarction (DC), Chest Pain (DC), Chest Pain (GEN)


Additional Instructions: 


Patient stable for discharge as per hospitalist





Patient to take medications as prescribed:


-ASA 81mg po qdaily Disp#30


-Lipitor 80mg po qhs Disp#30


-Plavix 75mg po qdaily Disp#30


-Lisinopril 2.5mg po qdaily Disp#30


-Lopressor 25mg po bid Disp#60





Patient to follow up with [facility] within 7 days.


Patient has card with number.





If symptoms persist or worsen patient to visit ED immediately.


Instructions discussed in detail with patient who understands and agrees.











<Surendra Simmons - Last Filed: 11/15/17 13:27>





Provider





- Provider


Date of Admission: 


11/13/17 00:44





Attending physician: 


Surendra Simmons MD








Hospital Course





- Lab Results


Lab Results: 


 Most Recent Lab Values











WBC  7.9 10^3/ul (4.5-11.0)   11/14/17  06:30    


 


RBC  5.09 10^6/uL (3.5-6.1)   11/14/17  06:30    


 


Hgb  14.8 g/dL (14.0-18.0)   11/14/17  06:30    


 


Hct  45.4 % (42.0-52.0)   11/14/17  06:30    


 


MCV  89.2 fl (80.0-105.0)   11/14/17  06:30    


 


MCH  29.1 pg (25.0-35.0)   11/14/17  06:30    


 


MCHC  32.6 g/dl (31.0-37.0)   11/14/17  06:30    


 


RDW  13.6 % (11.5-14.5)   11/14/17  06:30    


 


Plt Count  177 10^3/uL (120.0-450.0)   11/14/17  06:30    


 


MPV  9.6 fl (7.0-11.0)   11/14/17  06:30    


 


Gran %  65.7 % (50.0-68.0)   11/14/17  06:30    


 


Lymph % (Auto)  22.5 % (22.0-35.0)   11/14/17  06:30    


 


Mono % (Auto)  8.0 % (1.0-6.0)  H  11/14/17  06:30    


 


Eos % (Auto)  3.4 % (1.5-5.0)   11/14/17  06:30    


 


Baso % (Auto)  0.4 % (0.0-3.0)   11/14/17  06:30    


 


Gran #  5.18  (1.4-6.5)   11/14/17  06:30    


 


Lymph #  1.8  (1.2-3.4)   11/14/17  06:30    


 


Mono #  0.6  (0.1-0.6)   11/14/17  06:30    


 


Eos #  0.3  (0.0-0.7)   11/14/17  06:30    


 


Baso #  0.03 K/mm3 (0.0-2.0)   11/14/17  06:30    


 


PT  11.9 SECONDS (9.4-12.5)   11/12/17  22:45    


 


INR  1.08  (0.93-1.08)   11/12/17  22:45    


 


APTT  28.9 Seconds (25.1-36.5)   11/12/17  22:45    


 


Sodium  137 mmol/L (132-148)   11/14/17  06:30    


 


Potassium  4.4 mmol/L (3.6-5.0)   11/14/17  06:30    


 


Chloride  107 mmol/L ()   11/14/17  06:30    


 


Carbon Dioxide  26 mmol/L (21-33)   11/14/17  06:30    


 


Anion Gap  8  (10-20)  L  11/14/17  06:30    


 


BUN  14 mg/dL (7-21)   11/14/17  06:30    


 


Creatinine  0.8 mg/dL (0.8-1.5)   11/14/17  06:30    


 


Est GFR ( Amer)  > 60   11/14/17  06:30    


 


Est GFR (Non-Af Amer)  > 60   11/14/17  06:30    


 


Random Glucose  94 mg/dL ()   11/14/17  06:30    


 


Hemoglobin A1c  6.3 % (4.2-6.5)   11/13/17  05:30    


 


Calcium  8.5 mg/dL (8.4-10.5)   11/14/17  06:30    


 


Phosphorus  3.2 mg/dL (2.5-4.5)   11/14/17  06:30    


 


Magnesium  1.9 mg/dL (1.7-2.2)   11/14/17  06:30    


 


Total Bilirubin  1.3 mg/dL (0.2-1.3)   11/14/17  06:30    


 


AST  47 U/L (17-59)   11/14/17  06:30    


 


ALT  70 U/L (7-56)  H  11/14/17  06:30    


 


Alkaline Phosphatase  107 U/L ()   11/14/17  06:30    


 


Lactate Dehydrogenase  776 U/L (333-699)  H  11/12/17  22:45    


 


Total Creatine Kinase  292 U/L ()  H  11/12/17  22:45    


 


CK-MB (CK-2)  5.1 ng/mL (0.0-3.6)  H  11/12/17  22:45    


 


CK-MB (CK-2) %  1.7 % (2.5-3.0)  L  11/12/17  22:45    


 


Troponin I  1.12 ng/mL H* D 11/14/17  06:30    


 


NT-Pro-B Natriuret Pep  122 pg/mL (0-450)   11/12/17  22:45    


 


Total Protein  7.0 g/dL (5.8-8.3)   11/14/17  06:30    


 


Albumin  3.4 g/dL (3.0-4.8)   11/14/17  06:30    


 


Globulin  3.5 gm/dL  11/14/17  06:30    


 


Albumin/Globulin Ratio  1.0  (1.1-1.8)  L  11/14/17  06:30    


 


Triglycerides  76 mg/dL ()   11/13/17  05:30    


 


Cholesterol  158 mg/dL (130-200)   11/13/17  05:30    


 


LDL Cholesterol Direct  120 mg/dL (0-129)   11/13/17  05:30    


 


HDL Cholesterol  35 mg/dL (29-60)   11/13/17  05:30    


 


Lipase  127 U/L ()   11/12/17  22:45    


 


TSH 3rd Generation  2.80 mIU/mL (0.46-4.68)   11/13/17  05:30    














Attending/Attestation





- Attestation


I have personally seen and examined this patient.: Yes


I have fully participated in the care of the patient.: Yes


I have reviewed all pertinent clinical information, including history, physical 

exam and plan: Yes


Notes (Text): 





11/15/17 13:23





attending note;





Patient seen and examined with resident.





 patient is a 57-year-old male with past medical history of acute inferior wall 

MI in 04/2015 s/p 2 stents (1 in LAD and 1 in RCA), DM, former smoker and 

noncompliance with medications (due to insurance issues) who presented to the 

emergency room with chest pain. 


status post cardiac cath and drug-eluting stent placement of LAD.





The importance of taking medications explained to the patient, patient's 

daughter and patient's son in detail.





Medication delivered to the bedside.





Patient will follow-up with [facility] clinic next week.





diagnosis;


Acute non-ST elevation MI


Status post drug-eluting stent in LAD





11/15/17 13:27

## 2018-02-06 ENCOUNTER — HOSPITAL ENCOUNTER (EMERGENCY)
Dept: HOSPITAL 42 - ED | Age: 58
Discharge: HOME | End: 2018-02-06
Payer: COMMERCIAL

## 2018-02-06 VITALS
SYSTOLIC BLOOD PRESSURE: 141 MMHG | OXYGEN SATURATION: 98 % | HEART RATE: 89 BPM | DIASTOLIC BLOOD PRESSURE: 87 MMHG | RESPIRATION RATE: 16 BRPM

## 2018-02-06 VITALS — BODY MASS INDEX: 37.8 KG/M2

## 2018-02-06 DIAGNOSIS — M25.561: Primary | ICD-10-CM

## 2018-02-06 DIAGNOSIS — Z87.891: ICD-10-CM

## 2018-02-06 DIAGNOSIS — I10: ICD-10-CM

## 2018-02-06 DIAGNOSIS — M25.562: ICD-10-CM

## 2018-02-06 NOTE — ED PDOC
Arrival/HPI





- General


Chief Complaint: Lower Extremity Problem/Injury


Time Seen by Provider: 02/06/18 14:03


Historian: Patient





- History of Present Illness


Narrative History of Present Illness (Text): 


02/06/18 14:17


A 58 year old male, whose past medical history includes arthritis, presents to 

the emergency department complaining of bilateral knee pain, left worse than 

right, for the past year. Patient is Turkmen speaking, history obtained through 

scribe. Patient reports 1 year ago he had fluid removed from both knees and 

received injections. He states he had to return after 6 months but was unable 

due to insurance issues. Patient reports his pain has progressively worsened 

over the past week. He notes mild swelling and difficulty ambulating. Patient 

took Tylenol with no relief. Patient denies any injuries, trauma, fever, chills

, nausea, vomiting, abdominal pain, chest pain, shortness of breath or any 

other complaints. 





Time/Duration: Other (1 year)


Symptom Course: Worsening (over the past week)


Context: Home





Past Medical History





- Provider Review


Nursing Documentation Reviewed: Yes





- Infectious Disease


Hx of Infectious Diseases: None





- Cardiac


Hx Cardiac Disorders: Yes


Hx MI: Yes


Hx Hypertension: Yes


Hx Peripheral Vascular Disease: Yes


Other/Comment: coronary stent





- Pulmonary


Hx Respiratory Disorders: No





- Neurological


Hx Neurological Disorder: No





- HEENT


Hx HEENT Disorder: No





- Renal


Hx Renal Disorder: No





- Endocrine/Metabolic


Hx Endocrine Disorders: No


Hx Diabetes Mellitus Type 2: No





- Hematological/Oncological


Hx Blood Transfusions: No


Hx Blood Transfusion Reaction: No





- Integumentary


Hx Dermatological Disorder: No





- Musculoskeletal/Rheumatological


Hx Musculoskeletal Disorders: Yes


Hx Arthritis: Yes





- Gastrointestinal


Hx Gastrointestinal Disorders: No





- Genitourinary/Gynecological


Hx Genitourinary Disorders: No





- Psychiatric


Hx Psychophysiologic Disorder: No


Hx Substance Use: No





- Surgical History


Hx Cardiac Catheterization: Yes


Other/Comment: coronary stent





- Anesthesia


Hx Anesthesia: Yes


Hx Anesthesia Reactions: No


Hx Malignant Hyperthermia: No





- Suicidal Assessment


Feels Threatened In Home Enviroment: No





Family/Social History





- Physician Review


Nursing Documentation Reviewed: Yes


Family/Social History: No Known Family HX


Smoking Status: Former Smoker


Hx Alcohol Use: No (social)


Hx Substance Use: No





Allergies/Home Meds


Allergies/Adverse Reactions: 


Allergies





No Known Allergies Allergy (Verified 02/06/18 14:08)


 











Review of Systems





- Review of Systems


Constitutional: absent: Fevers, Night Sweats


Respiratory: absent: SOB


Cardiovascular: absent: Chest Pain, Edema, Calf Pain, [place]


Gastrointestinal: absent: Abdominal Pain, Nausea, Vomiting


Musculoskeletal: Other (Bilateral knee pain/swelling, left worse than right).  

absent: Arthralgias, Back Pain, Neck Pain, Joint Swelling, Myalgias


Skin: absent: Rash, Cellulitis


Neurological: absent: Headache, Dizziness





Physical Exam





- Physical Exam


Narrative Physical Exam (Text): 


Head:  Atraumatic.  Normocephalic. 


Eyes:  PERRL.  EOMI.  Conjunctivae are not pale.


ENT:  Mucous membranes are moist and intact.  Oropharynx is clear and 

symmetric. 


Neck:  Supple.  Full ROM. 


Cardiovascular:  Regular rate.  Regular rhythm.  Distal pulses are 2+ and 

symmetric.


Pulmonary/Chest:  No evidence of respiratory distress.  No wheezing, rales or 

rhonchi.


Abdominal:  Soft and non-distended.  There is no tenderness.  No rebound, 

guarding, or rigidity.  No organomegaly.  Good bowel sounds. 


Back:  No CVA tenderness.


Extremities:  There is pain on palpation of left lateral knee, no laxity, there 

is no edema or erythema, full range of motion with no overlying warmth, no 

achilles or calf pain, no hip pain. There is pain to right knee medial aspect 

with no erythema or edema.


Skin:  Skin is warm and dry.  No petechiae.  No purpura. 


Neurological:  Alert, awake, and oriented to person, place, time, and 

situation.  Normal speech. Motor and sensory exam intact.


Psychiatric:  Good eye contact.  Normal interaction, affect, and behavior.














Vital Signs Reviewed: Yes


Temperature: Afebrile


Appearance: Positive for: Uncomfortable


Pain Distress: Moderate


Mental Status: Positive for: Alert and Oriented X 3





Medical Decision Making


ED Course and Treatment: 


02/06/18 14:17


Impression:


A 58 year old male with bilateral knee pain, left worse than right.





Plan:


-- Bilateral knee xray


-- Tramadol


-- Reassess and disposition





Progress Notes:


Report Date : 02/06/2018 15:10:26


PROCEDURE:  Bilateral Knee Radiographs.


Dictator : Ezekiel Bravo MD


IMPRESSION: Severe joint space narrowing in the medial compartment bilaterally





Patient denies acute injury. On exam, no edema or fluctuance or erythema. No 

sign of septic joint by exam or history. No hip pain, no ankle or calf pain.


He denies any chest pain or shortness of breath, it has been noted he has prior 

cardiac history. Patient on re-evaluation with improvement in pain after 

Ultram. With  present, risks and side effects of this medication were 

reviewed extensively.


Xray results reviewed.


Will d/c with prescription for pain medication, follow-up with orthopedics.








- RAD Interpretation


Radiology Orders: 








02/06/18 14:17


KNEES BILATERAL [RAD] Stat 














- Medication Orders


Current Medication Orders: 











Discontinued Medications





Tramadol HCl (Ultram)  50 mg PO STAT STA


   Stop: 02/06/18 14:19


   Last Admin: 02/06/18 15:06  Dose: 50 mg





MAR Pain Assessment


 Document     02/06/18 15:06  HI  (Rec: 02/06/18 15:07  HI  [id number])


     Pain Reassessment


      Is this a pain reassessment?               No














- Scribe Statement


The provider has reviewed the documentation as recorded by the Scribe


Deb Watson





Provider Scribe Attestation:


All medical record entries made by the Scribe were at my direction and 

personally dictated by me. I have reviewed the chart and agree that the record 

accurately reflects my personal performance of the history, physical exam, 

medical decision making, and the department course for this patient. I have 

also personally directed, reviewed, and agree with the discharge instructions 

and disposition.








Disposition/Present on Arrival





- Present on Arrival


Any Indicators Present on Arrival: No


History of DVT/PE: No


History of Uncontrolled Diabetes: No


Urinary Catheter: No


History of Decub. Ulcer: No


History Surgical Site Infection Following: None





- Disposition


Have Diagnosis and Disposition been Completed?: Yes


Diagnosis: 


 Knee pain, bilateral





Disposition: HOME/ ROUTINE


Patient Problems: 


 Current Active Problems











Problem Status Onset


 


Knee pain, bilateral Acute  











Condition: GOOD


Discharge Instructions (ExitCare):  Knee Pain (ED), Arthritis (ED)


Print Language: Thai


Additional Instructions: 


Rest. No strenuous activity. Follow-up with orthopedic doctor.


For any redness, swelling, fevers, calf pain, hip pain, numbness/weakness, 

chest pain or shortness of breath, any persistent or worsening of any symptoms, 

get rechecked.





Take pain medication only as directed.


Prescriptions: 


traMADol [Ultram] 50 mg PO BID PRN #8 tab


 PRN Reason: Pain, Severe (8-10)


Referrals: 


 Service [Outside] - Follow up with primary


[facility] [Outside] - Follow up with primary


Erick Diana III, MD [Medical Doctor] - Follow up with primary


Forms:  [employer] (English)

## 2018-02-06 NOTE — RAD
PROCEDURE:  Bilateral Knee Radiographs.



HISTORY:

bilateral knee pain, worsening



COMPARISON:

None.



FINDINGS:



BONES:

Right Knee:  Normal. No fracture. 



Left Knee:  Normal. No fracture. 



JOINTS:

Right Knee: There is severe joint space narrowing in the medial 

compartment 



Left knee: Severe joint space narrowing in the medial compartment 



SOFT TISSUES:

Right Knee: Normal.



Left Knee: Normal.



JOINT EFFUSION:

Right Knee: None. 



Left Knee: None.



OTHER FINDINGS:

Mild degenerative changes in the patellofemoral joints



IMPRESSION:

Severe joint space narrowing in the medial compartment bilaterally

## 2018-04-10 ENCOUNTER — HOSPITAL ENCOUNTER (OUTPATIENT)
Dept: HOSPITAL 42 - ED | Age: 58
Setting detail: OBSERVATION
LOS: 2 days | Discharge: HOME | End: 2018-04-12
Attending: HOSPITALIST | Admitting: INTERNAL MEDICINE
Payer: COMMERCIAL

## 2018-04-10 VITALS — BODY MASS INDEX: 39.3 KG/M2

## 2018-04-10 DIAGNOSIS — I25.2: ICD-10-CM

## 2018-04-10 DIAGNOSIS — E78.00: ICD-10-CM

## 2018-04-10 DIAGNOSIS — E11.9: ICD-10-CM

## 2018-04-10 DIAGNOSIS — Z80.0: ICD-10-CM

## 2018-04-10 DIAGNOSIS — Z95.5: ICD-10-CM

## 2018-04-10 DIAGNOSIS — I10: ICD-10-CM

## 2018-04-10 DIAGNOSIS — Z87.891: ICD-10-CM

## 2018-04-10 DIAGNOSIS — I25.110: Primary | ICD-10-CM

## 2018-04-10 LAB
ALBUMIN SERPL-MCNC: 3.6 G/DL (ref 3–4.8)
ALBUMIN/GLOB SERPL: 1.1 {RATIO} (ref 1.1–1.8)
ALT SERPL-CCNC: 54 U/L (ref 7–56)
APTT BLD: 28.9 SECONDS (ref 25.1–36.5)
AST SERPL-CCNC: 26 U/L (ref 17–59)
BASOPHILS # BLD AUTO: 0.03 K/MM3 (ref 0–2)
BASOPHILS NFR BLD: 0.4 % (ref 0–3)
BUN SERPL-MCNC: 18 MG/DL (ref 7–21)
CALCIUM SERPL-MCNC: 9.1 MG/DL (ref 8.4–10.5)
EOSINOPHIL # BLD: 0.1 10*3/UL (ref 0–0.7)
EOSINOPHIL NFR BLD: 1.1 % (ref 1.5–5)
ERYTHROCYTE [DISTWIDTH] IN BLOOD BY AUTOMATED COUNT: 13.6 % (ref 11.5–14.5)
GFR NON-AFRICAN AMERICAN: > 60
GRANULOCYTES # BLD: 5.38 10*3/UL (ref 1.4–6.5)
GRANULOCYTES NFR BLD: 65.3 % (ref 50–68)
HGB BLD-MCNC: 14.9 G/DL (ref 14–18)
INR PPP: 1.09 (ref 0.93–1.08)
LYMPHOCYTES # BLD: 2.3 10*3/UL (ref 1.2–3.4)
LYMPHOCYTES NFR BLD AUTO: 27.3 % (ref 22–35)
MCH RBC QN AUTO: 29.6 PG (ref 25–35)
MCHC RBC AUTO-ENTMCNC: 33.7 G/DL (ref 31–37)
MCV RBC AUTO: 87.7 FL (ref 80–105)
MONOCYTES # BLD AUTO: 0.5 10*3/UL (ref 0.1–0.6)
MONOCYTES NFR BLD: 5.9 % (ref 1–6)
PLATELET # BLD: 174 10^3/UL (ref 120–450)
PMV BLD AUTO: 9.1 FL (ref 7–11)
PROTHROMBIN TIME: 12.4 SECONDS (ref 9.4–12.5)
RBC # BLD AUTO: 5.04 10^6/UL (ref 3.5–6.1)
TROPONIN I SERPL-MCNC: 0.03 NG/ML
WBC # BLD AUTO: 8.2 10^3/UL (ref 4.5–11)

## 2018-04-10 PROCEDURE — 83036 HEMOGLOBIN GLYCOSYLATED A1C: CPT

## 2018-04-10 PROCEDURE — 81003 URINALYSIS AUTO W/O SCOPE: CPT

## 2018-04-10 PROCEDURE — 83615 LACTATE (LD) (LDH) ENZYME: CPT

## 2018-04-10 PROCEDURE — 99285 EMERGENCY DEPT VISIT HI MDM: CPT

## 2018-04-10 PROCEDURE — 85610 PROTHROMBIN TIME: CPT

## 2018-04-10 PROCEDURE — 85025 COMPLETE CBC W/AUTO DIFF WBC: CPT

## 2018-04-10 PROCEDURE — 36415 COLL VENOUS BLD VENIPUNCTURE: CPT

## 2018-04-10 PROCEDURE — 82550 ASSAY OF CK (CPK): CPT

## 2018-04-10 PROCEDURE — 84100 ASSAY OF PHOSPHORUS: CPT

## 2018-04-10 PROCEDURE — 85730 THROMBOPLASTIN TIME PARTIAL: CPT

## 2018-04-10 PROCEDURE — 83735 ASSAY OF MAGNESIUM: CPT

## 2018-04-10 PROCEDURE — 80053 COMPREHEN METABOLIC PANEL: CPT

## 2018-04-10 PROCEDURE — 71045 X-RAY EXAM CHEST 1 VIEW: CPT

## 2018-04-10 PROCEDURE — 93458 L HRT ARTERY/VENTRICLE ANGIO: CPT

## 2018-04-10 PROCEDURE — 93005 ELECTROCARDIOGRAM TRACING: CPT

## 2018-04-10 PROCEDURE — 80061 LIPID PANEL: CPT

## 2018-04-10 PROCEDURE — 84443 ASSAY THYROID STIM HORMONE: CPT

## 2018-04-10 PROCEDURE — 84484 ASSAY OF TROPONIN QUANT: CPT

## 2018-04-10 PROCEDURE — 82948 REAGENT STRIP/BLOOD GLUCOSE: CPT

## 2018-04-10 PROCEDURE — 99152 MOD SED SAME PHYS/QHP 5/>YRS: CPT

## 2018-04-10 NOTE — ED PDOC
Arrival/HPI





- General


Time Seen by Provider: 04/10/18 22:57


Historian: Patient





- History of Present Illness


Narrative History of Present Illness (Text): 


04/10/18 22:58


Milind Martino is a 58 year old male, whose past medical history 

includes CAD with 3 cardiac stents, MI, and diabetes, who presents to the 

Emergency department complaining of chest pain. Patient states he developed mid-

sternal chest pain at approximately 21:00 tonight. Patient denies any history 

of tobacco abuse. Patient denies any fever, chills, shortness of breath, nausea

, vomiting, diarrhea, urinary symptoms, back pain, neck pain, headache, 

dizziness, or any other complaints.


Time/Duration: 1-3 hours


Symptom Onset: Gradual


Symptom Course: Unchanged


Activities at Onset: Light


Context: Home





Past Medical History





- Provider Review


Nursing Documentation Reviewed: Yes





- Infectious Disease


Hx of Infectious Diseases: None





- Cardiac


Hx Cardiac Disorders: Yes


Hx MI: Yes





- Pulmonary


Hx Respiratory Disorders: No





- Neurological


Hx Neurological Disorder: No





- HEENT


Hx HEENT Disorder: No





- Renal


Hx Renal Disorder: No





- Endocrine/Metabolic


Hx Endocrine Disorders: No


Hx Diabetes Mellitus Type 2: No





- Hematological/Oncological


Hx Blood Transfusions: No


Hx Blood Transfusion Reaction: No





- Integumentary


Hx Dermatological Disorder: No





- Musculoskeletal/Rheumatological


Hx Musculoskeletal Disorders: Yes





- Gastrointestinal


Hx Gastrointestinal Disorders: No





- Genitourinary/Gynecological


Hx Genitourinary Disorders: No





- Psychiatric


Hx Psychophysiologic Disorder: No


Hx Substance Use: No





- Surgical History


Hx Cardiac Catheterization: Yes


Other/Comment: coronary stent





- Anesthesia


Hx Anesthesia: Yes


Hx Anesthesia Reactions: No


Hx Malignant Hyperthermia: No





- Suicidal Assessment


Feels Threatened In Home Enviroment: No





Family/Social History





- Physician Review


Nursing Documentation Reviewed: Yes


Family/Social History: Unknown Family HX


Smoking Status: Former Smoker


Hx Alcohol Use: No (social)


Hx Substance Use: No





Allergies/Home Meds


Allergies/Adverse Reactions: 


Allergies





No Known Allergies Allergy (Verified 04/10/18 22:57)


 








Home Medications: 


 Home Meds











 Medication  Instructions  Recorded  Confirmed


 


metFORMIN [glucOPHAGE] 500 mg PO BID 04/10/18 04/10/18














Review of Systems





- Physician Review


All systems were reviewed & negative as marked: Yes





- Review of Systems


Constitutional: Normal.  absent: Fevers


Eyes: Normal


ENT: Normal


Respiratory: Normal.  absent: SOB, Cough


Cardiovascular: Chest Pain


Gastrointestinal: Normal.  absent: Abdominal Pain, Diarrhea, Nausea, Vomiting


Genitourinary Male: Normal.  absent: Dysuria, Frequency, Hematuria, Urinary 

Output Changes


Musculoskeletal: Normal.  absent: Back Pain, Neck Pain


Skin: Normal.  absent: Rash


Neurological: Normal.  absent: Headache, Dizziness


Endocrine: Normal


Hemo/Lymphatic: Normal


Psychiatric: Normal





Physical Exam


Vital Signs Reviewed: Yes


Vital Signs











  Temp Pulse Resp BP Pulse Ox


 


 04/11/18 02:04   66  18  111/66  97


 


 04/10/18 23:02  98.4 F  100 H  25 H  156/95 H  98











Temperature: Afebrile


Blood Pressure: Hypertensive


Pulse: Regular


Respiratory Rate: Normal


Appearance: Positive for: Well-Appearing, Non-Toxic, Comfortable


Pain Distress: None


Mental Status: Positive for: Alert and Oriented X 3





- Systems Exam


Head: Present: Atraumatic, Normocephalic


Pupils: Present: PERRL


Extroacular Muscles: Present: EOMI


Conjunctiva: Present: Normal


Mouth: Present: Moist Mucous Membranes


Neck: Present: Normal Range of Motion


Respiratory/Chest: Present: Clear to Auscultation, Good Air Exchange.  No: 

Respiratory Distress, Accessory Muscle Use


Cardiovascular: Present: Regular Rate and Rhythm, Normal S1, S2.  No: Murmurs


Abdomen: No: Tenderness, Distention, Peritoneal Signs


Back: Present: Normal Inspection


Upper Extremity: Present: Normal Inspection.  No: Cyanosis, Edema


Lower Extremity: Present: Normal Inspection.  No: Edema


Neurological: Present: GCS=15, CN II-XII Intact, Speech Normal


Skin: Present: Warm, Dry, Normal Color.  No: Rashes


Psychiatric: Present: Alert, Oriented x 3, Normal Insight, Normal Concentration





Medical Decision Making


ED Course and Treatment: 


04/10/18 22:58


Impression:


58 year old male complaining of mid-sternal chest pain since 21:00 tonight. 





Plan:


-- EKG


-- Chest X-ray


-- Labs, cardiac enzymes


-- Urinalysis


-- Reassess and disposition





Progress Notes:


Reviewed EKG, NSR at 97 bpm. Non-specific ST/T wave changes.





04/11/18 00:50


Chest X-ray reviewed, shows no acute processes.





04/11/18 01:03


Case discussed with medical resident on call, who is aware and agrees with plan.


Case discussed with house physician, who is aware and agrees with plan. Pt will 

go to Telemetry observation for chest pain under the hospitalist service.





- Lab Interpretations


Lab Results: 








 04/10/18 23:20 





 04/10/18 23:20 





 Lab Results





04/10/18 23:20: Sodium 140, Potassium 3.9, Chloride 107, Carbon Dioxide 24, 

Anion Gap 13, BUN 18, Creatinine 0.9, Est GFR (African Amer) > 60, Est GFR (Non-

Af Amer) > 60, Random Glucose 134 H, Calcium 9.1, Magnesium 1.9, Total 

Bilirubin 0.4, AST 26, ALT 54, Alkaline Phosphatase 95, Lactate Dehydrogenase 

551, Total Creatine Kinase 173, Troponin I 0.03  D, Total Protein 7.0, Albumin 

3.6, Globulin 3.4, Albumin/Globulin Ratio 1.1


04/10/18 23:20: PT 12.4, INR 1.09 H, APTT 28.9


04/10/18 23:20: WBC 8.2, RBC 5.04, Hgb 14.9, Hct 44.2, MCV 87.7, MCH 29.6, MCHC 

33.7, RDW 13.6, Plt Count 174, MPV 9.1, Gran % 65.3, Lymph % (Auto) 27.3, Mono 

% (Auto) 5.9, Eos % (Auto) 1.1 L, Baso % (Auto) 0.4, Gran # 5.38, Lymph # (Auto

) 2.3, Mono # (Auto) 0.5, Eos # (Auto) 0.1, Baso # (Auto) 0.03








I have reviewed the lab results: Yes





- RAD Interpretation


Radiology Orders: 








04/10/18 22:59


CHEST PORTABLE [RAD] Stat 











: ED Physician





- EKG Interpretation


Interpreted by ED Physician: Yes


Type: 12 lead EKG





- Medication Orders


Current Medication Orders: 








Aspirin (Ecotrin)  81 mg PO DAILY TJ


Atorvastatin Calcium (Lipitor)  80 mg PO DAILY TJ


Clopidogrel Bisulfate (Plavix)  75 mg PO DAILY Replaced by [facility]


Insulin Human Lispro (Humalog Med)  0 units SC ACHS TJ


   PRN Reason: Protocol


Lisinopril (Zestril)  2.5 mg PO DAILY Replaced by [facility]


Metoprolol Tartrate (Lopressor)  25 mg PO BID TJ





Discontinued Medications





Aspirin (Aspirin Chewable)  243 mg PO STAT STA


   Stop: 04/11/18 01:11


   Last Admin: 04/11/18 02:00  Dose: 243 mg





Nitroglycerin (Nitro-Bid 2% Oint)  1 ea TOP ONCE STA


   Stop: 04/11/18 00:18


   Last Admin: 04/11/18 00:47  Dose: 1 ea











- Scribe Statement


The provider has reviewed the documentation as recorded by the Dickibdillon Stark





Provider Scribe Attestation:


All medical record entries made by the Scribe were at my direction and 

personally dictated by me. I have reviewed the chart and agree that the record 

accurately reflects my personal performance of the history, physical exam, 

medical decision making, and the department course for this patient. I have 

also personally directed, reviewed, and agree with the discharge instructions 

and disposition.








Disposition/Present on Arrival





- Present on Arrival


History of DVT/PE: No


History of Uncontrolled Diabetes: No


Urinary Catheter: No


History Surgical Site Infection Following: None





- Disposition

## 2018-04-11 LAB
ALBUMIN SERPL-MCNC: 3.3 G/DL (ref 3–4.8)
ALBUMIN/GLOB SERPL: 1 {RATIO} (ref 1.1–1.8)
ALT SERPL-CCNC: 48 U/L (ref 7–56)
APPEARANCE UR: CLEAR
AST SERPL-CCNC: 28 U/L (ref 17–59)
BASOPHILS # BLD AUTO: 0.03 K/MM3 (ref 0–2)
BASOPHILS NFR BLD: 0.3 % (ref 0–3)
BILIRUB UR-MCNC: NEGATIVE MG/DL
BUN SERPL-MCNC: 18 MG/DL (ref 7–21)
CALCIUM SERPL-MCNC: 8.7 MG/DL (ref 8.4–10.5)
COLOR UR: YELLOW
EOSINOPHIL # BLD: 0.2 10*3/UL (ref 0–0.7)
EOSINOPHIL NFR BLD: 2.3 % (ref 1.5–5)
ERYTHROCYTE [DISTWIDTH] IN BLOOD BY AUTOMATED COUNT: 13.7 % (ref 11.5–14.5)
GFR NON-AFRICAN AMERICAN: > 60
GLUCOSE UR STRIP-MCNC: NEGATIVE MG/DL
GRANULOCYTES # BLD: 4.88 10*3/UL (ref 1.4–6.5)
GRANULOCYTES NFR BLD: 56.6 % (ref 50–68)
HDLC SERPL-MCNC: 38 MG/DL (ref 29–60)
HGB BLD-MCNC: 14.1 G/DL (ref 14–18)
LDLC SERPL-MCNC: 40 MG/DL (ref 0–129)
LEUKOCYTE ESTERASE UR-ACNC: NEGATIVE LEU/UL
LYMPHOCYTES # BLD: 2.9 10*3/UL (ref 1.2–3.4)
LYMPHOCYTES NFR BLD AUTO: 33.3 % (ref 22–35)
MCH RBC QN AUTO: 29.1 PG (ref 25–35)
MCHC RBC AUTO-ENTMCNC: 33.1 G/DL (ref 31–37)
MCV RBC AUTO: 87.8 FL (ref 80–105)
MONOCYTES # BLD AUTO: 0.7 10*3/UL (ref 0.1–0.6)
MONOCYTES NFR BLD: 7.5 % (ref 1–6)
PH UR STRIP: 6 [PH] (ref 4.7–8)
PLATELET # BLD: 180 10^3/UL (ref 120–450)
PMV BLD AUTO: 9.5 FL (ref 7–11)
PROT UR STRIP-MCNC: NEGATIVE MG/DL
RBC # BLD AUTO: 4.85 10^6/UL (ref 3.5–6.1)
RBC # UR STRIP: NEGATIVE /UL
SP GR UR STRIP: 1.02 (ref 1–1.03)
TROPONIN I SERPL-MCNC: 0.1 NG/ML
UROBILINOGEN UR STRIP-ACNC: 0.2 E.U./DL
WBC # BLD AUTO: 8.6 10^3/UL (ref 4.5–11)

## 2018-04-11 RX ADMIN — INSULIN LISPRO SCH: 100 INJECTION, SOLUTION INTRAVENOUS; SUBCUTANEOUS at 22:25

## 2018-04-11 RX ADMIN — INSULIN LISPRO SCH: 100 INJECTION, SOLUTION INTRAVENOUS; SUBCUTANEOUS at 07:52

## 2018-04-11 RX ADMIN — INSULIN LISPRO SCH: 100 INJECTION, SOLUTION INTRAVENOUS; SUBCUTANEOUS at 17:28

## 2018-04-11 RX ADMIN — INSULIN LISPRO SCH: 100 INJECTION, SOLUTION INTRAVENOUS; SUBCUTANEOUS at 12:24

## 2018-04-11 NOTE — CARD
--------------- APPROVED REPORT --------------





EKG Measurement

Heart [id number]

IA [id number]

[id number]

[id number]

[id number]



<Conclusion>

Sinus bradycardia

LVH

Inferior infarct, age undetermined

NSSTW changes

## 2018-04-11 NOTE — CP.PCM.HP
<Darlyn Ivan - Last Filed: 04/11/18 03:15>





History of Present Illness





- History of Present Illness


History of Present Illness: 





Darlyn Ivan, PGY1. H&P for Dr Hunt:





CC: chest pain





58 year old male, whose past medical history includes CAD with 3 cardiac stents

, MI, and diabetes, presents to ED for left sided cp and right sided neck pain 

that started at 9 PM last night. Pt states that he was watching TV when it 

started. He describes it as pressure like, nonradiating, has associated 

diaphoresis, denies sob, n/v/epigastric pain, dizziness, syncope, LOC. Pt 

states that he is compliant with his home meds. Otherwise denies fever, chills, 

headache, urinary symptoms, orthopnea, diarrhea, constipation, leg swelling.





12 point ROS obtained and negative, except as per HPI.





Primary Medical Doctor: [facility] (Dr Martino)


Past medical history: coronary artery disease x3 stents, DM


Past surgical history: varicose veins surgery


Allergies: denies 


Family History: DM and mother had gastric cancer 


Medications: see MAR


Social: denies tobacco use. Consumes 6-10 beers a week, denies any illicit drug 

use. Works in [employer].








Present on Admission





- Present on Admission


Any Indicators Present on Admission: No


History of DVT/PE: No


History of Uncontrolled Diabetes: No


Urinary Catheter: No


Decubitus Ulcer Present: No





Review of Systems





- Review of Systems


All systems: reviewed and no additional remarkable complaints except


Review of Systems: 





as per HPI





Past Patient History





- Infectious Disease


Hx of Infectious Diseases: None





- Past Social History


Smoking Status: Former Smoker





- CARDIAC


Hx Cardiac Disorders: Yes


Hx Heart Attack: Yes





- PULMONARY


Hx Respiratory Disorders: No





- NEUROLOGICAL


Hx Neurological Disorder: No





- HEENT


Hx HEENT Problems: No





- RENAL


Hx Chronic Kidney Disease: No





- ENDOCRINE/METABOLIC


Hx Endocrine Disorders: No


Hx Diabetes Mellitus Type 2: No





- HEMATOLOGICAL/ONCOLOGICAL


Hx Blood Transfusions: No


Hx Blood Transfusion Reaction: No





- INTEGUMENTARY


Hx Dermatological Problems: No





- MUSCULOSKELETAL/RHEUMATOLOGICAL


Hx Musculoskeletal Disorders: Yes





- GASTROINTESTINAL


Hx Gastrointestinal Disorders: No





- GENITOURINARY/GYNECOLOGICAL


Hx Genitourinary Disorders: No





- PSYCHIATRIC


Hx Psychophysiologic Disorder: No


Hx Substance Use: No





- SURGICAL HISTORY


Hx Cardiac Catheterization: Yes


Other/Comment: coronary stent





- ANESTHESIA


Hx Anesthesia: Yes


Hx Anesthesia Reactions: No


Hx Malignant Hyperthermia: No





Meds


Allergies/Adverse Reactions: 


 Allergies











Allergy/AdvReac Type Severity Reaction Status Date / Time


 


No Known Allergies Allergy   Verified 04/10/18 22:57














Physical Exam





- Constitutional


Appears: Non-toxic, No Acute Distress


Additional comments: 





appears diaphoretic





- Head Exam


Head Exam: ATRAUMATIC, NORMOCEPHALIC





- Eye Exam


Eye Exam: EOMI, PERRL.  absent: Conjunctival injection, Nystagmus, Scleral 

icterus


Pupil Exam: NORMAL ACCOMODATION, PERRL.  absent: Fixed, Irregular, Unequal





- ENT Exam


ENT Exam: Mucous Membranes Moist





- Neck Exam


Neck exam: Positive for: Full Rom, Tenderness (TTP in right neck region)





- Respiratory Exam


Respiratory Exam: Clear to Auscultation Bilateral, NORMAL BREATHING PATTERN.  

absent: Accessory Muscle Use, Rhonchi, Wheezes, Stridor





- Cardiovascular Exam


Cardiovascular Exam: RRR, +S1, +S2.  absent: Systolic Murmur





- GI/Abdominal Exam


GI & Abdominal Exam: Normal Bowel Sounds, Soft.  absent: Distended, Firm, 

Guarding, Rebound, Rigid, Tenderness





- Extremities Exam


Extremities exam: Positive for: normal inspection.  Negative for: calf 

tenderness, pedal edema





- Back Exam


Back exam: NORMAL INSPECTION





- Neurological Exam


Neurological exam: Alert, Oriented x3





- Psychiatric Exam


Psychiatric exam: Normal Affect, Normal Mood





- Skin


Skin Exam: Dry, Normal Color, Warm





Results





- Vital Signs


Recent Vital Signs: 





 Last Vital Signs











Temp  98.4 F   04/10/18 23:02


 


Pulse  66   04/11/18 02:04


 


Resp  18   04/11/18 02:04


 


BP  111/66   04/11/18 02:04


 


Pulse Ox  97   04/11/18 02:04














- Labs


Result Diagrams: 


 04/10/18 23:20





 04/10/18 23:20





Assessment & Plan





- Assessment and Plan (Free Text)


Assessment: 





58 year old male with past medical history of CADx 3 stents, DM, presents for 

chest pain: 





Chest Pain- Rule out ACS:


-EKG, NSR at 97 bpm. Non-specific ST/T wave changes, pending official read


-chest xray : no active disease, pending official read 


-initial trop: 0.03


-Serial trops and EKG in AM 


-pt already took 80 mg Lipitor, ASA 81 mg, Metoprolol 25 mg bid, lisinopril at 

home. Will resume home meds. 


-given  mg in ED


-Nitroglycerin SL PRN


-Hemoglobin A1C, TSH, lipid panel 


-cardio consultedJuanjo, will follow recs





Hx of CAD:


-Lipid panel


-home Lipitor 80mg





Hx of DM:


-Hemoglobin A1C


-ISS-med





GI/DVT Prophylaxis:


-SCDs


-Protonix





Discussed with Dr RADHA Hunt. 





- Date & Time


Date: 04/11/18


Time: 03:28





<Williams Hunt - Last Filed: 04/11/18 20:10>





Results





- Vital Signs


Recent Vital Signs: 





 Last Vital Signs











Temp  98.2 F   04/11/18 18:00


 


Pulse  61   04/11/18 18:00


 


Resp  20   04/11/18 18:00


 


BP  126/66   04/11/18 18:00


 


Pulse Ox  97   04/11/18 05:55














- Labs


Result Diagrams: 


 04/11/18 06:00





 04/11/18 06:00


Labs: 





 Laboratory Results - last 24 hr











  04/11/18 04/11/18 04/11/18





  02:36 06:00 06:00


 


WBC    8.6


 


RBC    4.85


 


Hgb    14.1


 


Hct    42.6


 


MCV    87.8


 


MCH    29.1


 


MCHC    33.1


 


RDW    13.7


 


Plt Count    180


 


MPV    9.5


 


Gran %    56.6


 


Lymph % (Auto)    33.3


 


Mono % (Auto)    7.5 H


 


Eos % (Auto)    2.3


 


Baso % (Auto)    0.3


 


Gran #    4.88


 


Lymph # (Auto)    2.9


 


Mono # (Auto)    0.7 H


 


Eos # (Auto)    0.2


 


Baso # (Auto)    0.03


 


Sodium   139 


 


Potassium   4.2 


 


Chloride   106 


 


Carbon Dioxide   26 


 


Anion Gap   11 


 


BUN   18 


 


Creatinine   0.7 L 


 


Est GFR ( Amer)   > 60 


 


Est GFR (Non-Af Amer)   > 60 


 


POC Glucose (mg/dL)  92  


 


Random Glucose   97 


 


Calcium   8.7 


 


Total Bilirubin   0.4 


 


AST   28 


 


ALT   48 


 


Alkaline Phosphatase   94 


 


Troponin I   0.10  D 


 


Total Protein   6.5 


 


Albumin   3.3 


 


Globulin   3.2 


 


Albumin/Globulin Ratio   1.0 L 














  04/11/18 04/11/18 04/11/18





  07:24 11:16 11:25


 


WBC   


 


RBC   


 


Hgb   


 


Hct   


 


MCV   


 


MCH   


 


MCHC   


 


RDW   


 


Plt Count   


 


MPV   


 


Gran %   


 


Lymph % (Auto)   


 


Mono % (Auto)   


 


Eos % (Auto)   


 


Baso % (Auto)   


 


Gran #   


 


Lymph # (Auto)   


 


Mono # (Auto)   


 


Eos # (Auto)   


 


Baso # (Auto)   


 


Sodium   


 


Potassium   


 


Chloride   


 


Carbon Dioxide   


 


Anion Gap   


 


BUN   


 


Creatinine   


 


Est GFR ( Amer)   


 


Est GFR (Non-Af Amer)   


 


POC Glucose (mg/dL)  88  97 


 


Random Glucose   


 


Calcium   


 


Total Bilirubin   


 


AST   


 


ALT   


 


Alkaline Phosphatase   


 


Troponin I    0.09


 


Total Protein   


 


Albumin   


 


Globulin   


 


Albumin/Globulin Ratio   














  04/11/18





  16:21


 


WBC 


 


RBC 


 


Hgb 


 


Hct 


 


MCV 


 


MCH 


 


MCHC 


 


RDW 


 


Plt Count 


 


MPV 


 


Gran % 


 


Lymph % (Auto) 


 


Mono % (Auto) 


 


Eos % (Auto) 


 


Baso % (Auto) 


 


Gran # 


 


Lymph # (Auto) 


 


Mono # (Auto) 


 


Eos # (Auto) 


 


Baso # (Auto) 


 


Sodium 


 


Potassium 


 


Chloride 


 


Carbon Dioxide 


 


Anion Gap 


 


BUN 


 


Creatinine 


 


Est GFR ( Amer) 


 


Est GFR (Non-Af Amer) 


 


POC Glucose (mg/dL)  87


 


Random Glucose 


 


Calcium 


 


Total Bilirubin 


 


AST 


 


ALT 


 


Alkaline Phosphatase 


 


Troponin I 


 


Total Protein 


 


Albumin 


 


Globulin 


 


Albumin/Globulin Ratio

## 2018-04-11 NOTE — CON
DATE:  04/11/2018



CARDIOLOGY CONSULTATION



HISTORY OF PRESENT ILLNESS:  The patient is a 58-year-old male with

multiple cardiac risk factors who presents with chest discomfort.



His troponins were borderline elevated.  The patient's cardiac risk factors

include a history of documented coronary artery disease with PTCA and stent

in 2017.  He suffers from diabetes mellitus and hypercholesterolemia.



He also suffers from hypertension.  He is on Zestril.



The patient's weight is unchanged since he presented last year.  He denies

smoking.



REVIEW OF SYSTEMS:  A 14-point review of systems was reviewed in detail. 

No additional cardiac symptoms are noted.



PHYSICAL EXAMINATION:

VITAL SIGNS:  Blood pressure 110/68, the heart rates in the 50s.

NECK:  Negative JVD.

LUNGS:  Without rales.

HEART:  Reveals S1, S2.

EXTREMITIES:  Without edema.



EKG shows no acute changes.



LABORATORY DATA:  Troponins are 0.1 and 0.09, BUN and creatinine

unremarkable.  Hemoglobin is 14.



IMPRESSION:

1.  Unstable angina.

2.  History of coronary artery disease.

3.  Hypertension.

4.  Hypercholesterolemia.



PLAN:  Given these findings, we will place the patient back on aspirin and

Plavix as well as statin therapy.  We will proceed with a cardiac

catheterization in the morning.







__________________________________________

Kenji Geiger MD



DD:  04/11/2018 14:04:15

DT:  04/11/2018 14:07:50

Job # [id number]

## 2018-04-11 NOTE — RAD
HISTORY:

Chest pain



COMPARISON:

11/12/2017 



FINDINGS:



LUNGS:

No active pulmonary disease.



PLEURA:

No significant pleural effusion identified, no pneumothorax apparent.



CARDIOVASCULAR:

 No radiographic findings to suggest acute or significant 

cardiovascular disease.



OSSEOUS STRUCTURES:

No significant abnormalities.



VISUALIZED UPPER ABDOMEN:

Normal.



OTHER FINDINGS:

None.



IMPRESSION:

No active disease. No significant interval change compared to the 

prior examination(s).

## 2018-04-11 NOTE — CARD
--------------- APPROVED REPORT --------------





EKG Measurement

Heart [id number]

IA [id number]

[id number]

[id number]

[id number]



<Conclusion>

Normal sinus rhythm

Possible Inferior infarct, age undetermined

Abnormal ECG

## 2018-04-12 VITALS
HEART RATE: 59 BPM | RESPIRATION RATE: 19 BRPM | SYSTOLIC BLOOD PRESSURE: 129 MMHG | TEMPERATURE: 97.9 F | DIASTOLIC BLOOD PRESSURE: 85 MMHG

## 2018-04-12 VITALS — OXYGEN SATURATION: 98 %

## 2018-04-12 LAB
ALBUMIN SERPL-MCNC: 4 G/DL (ref 3–4.8)
ALBUMIN/GLOB SERPL: 1 {RATIO} (ref 1.1–1.8)
ALT SERPL-CCNC: 46 U/L (ref 7–56)
AST SERPL-CCNC: 29 U/L (ref 17–59)
BASOPHILS # BLD AUTO: 0.03 K/MM3 (ref 0–2)
BASOPHILS NFR BLD: 0.3 % (ref 0–3)
BUN SERPL-MCNC: 15 MG/DL (ref 7–21)
CALCIUM SERPL-MCNC: 9.7 MG/DL (ref 8.4–10.5)
EOSINOPHIL # BLD: 0.2 10*3/UL (ref 0–0.7)
EOSINOPHIL NFR BLD: 2 % (ref 1.5–5)
ERYTHROCYTE [DISTWIDTH] IN BLOOD BY AUTOMATED COUNT: 13.9 % (ref 11.5–14.5)
GFR NON-AFRICAN AMERICAN: > 60
GRANULOCYTES # BLD: 6.38 10*3/UL (ref 1.4–6.5)
GRANULOCYTES NFR BLD: 59 % (ref 50–68)
HGB BLD-MCNC: 16.3 G/DL (ref 14–18)
LYMPHOCYTES # BLD: 3.5 10*3/UL (ref 1.2–3.4)
LYMPHOCYTES NFR BLD AUTO: 32.1 % (ref 22–35)
MCH RBC QN AUTO: 29.2 PG (ref 25–35)
MCHC RBC AUTO-ENTMCNC: 32.7 G/DL (ref 31–37)
MCV RBC AUTO: 89.1 FL (ref 80–105)
MONOCYTES # BLD AUTO: 0.7 10*3/UL (ref 0.1–0.6)
MONOCYTES NFR BLD: 6.6 % (ref 1–6)
PLATELET # BLD: 179 10^3/UL (ref 120–450)
PMV BLD AUTO: 9.4 FL (ref 7–11)
RBC # BLD AUTO: 5.59 10^6/UL (ref 3.5–6.1)
WBC # BLD AUTO: 10.8 10^3/UL (ref 4.5–11)

## 2018-04-12 RX ADMIN — INSULIN LISPRO SCH: 100 INJECTION, SOLUTION INTRAVENOUS; SUBCUTANEOUS at 07:37

## 2018-04-12 RX ADMIN — INSULIN LISPRO SCH: 100 INJECTION, SOLUTION INTRAVENOUS; SUBCUTANEOUS at 11:41

## 2018-04-12 RX ADMIN — INSULIN LISPRO SCH: 100 INJECTION, SOLUTION INTRAVENOUS; SUBCUTANEOUS at 16:15

## 2018-04-12 NOTE — CARDCATH
DATE:  04/12/2018



HISTORY:  The patient is a 58-year-old male with a previous myocardial

infarction as well as PTCA and stents who presents with unstable angina.



Because of this, cardiac catheterization was recommended.



PROCEDURE:  Left heart catheterization with coronary arteriography and left

ventriculogram.



The right femoral artery was cannulated with a 6-Togolese sheath.  There were

no complications.



I performed moderate sedation which included the presence of an independent

trained observer that assisted in monitoring the patient's level of

consciousness and physiologic status.  After administration of Versed and

fentanyl, my intra-service time was 15 minutes.



The findings on catheterization revealed a left ventricle that revealed

inferior wall hypokinesis.  Estimated ejection fraction is 45%-50%.



The patient had a codominant circulation.  The RCA revealed diffuse intimal

irregularities with a 50% stenosis in the proximal portion as well as a

patent stent in the midportion.



The left main artery was unremarkable.



The LAD stent was patent.



Diagonal vessels were free of significant disease.  The circumflex artery

and obtuse marginal branches were free of significant disease.



Angio-Seal was used to close the femoral artery site.  The patient

tolerated the procedure well.



In summary, the procedure revealed patent stents in the LAD and RCA with a

50% stenosis in the proximal RCA.



LV function revealed inferior wall hypokinesis consistent with his old

inferior wall MI.  Estimated ejection fraction is 45%-50%.



Given these findings, the patient will need to continue a cardiac risk

reduction program.  The patient can be discharged today after 3 o'clock.





__________________________________________

Kenji Geiger MD





DD:  04/12/2018 9:52:09

DT:  04/12/2018 9:56:07

Job # [id number]

## 2018-06-06 ENCOUNTER — HOSPITAL ENCOUNTER (OUTPATIENT)
Dept: HOSPITAL 42 - ED | Age: 58
Setting detail: OBSERVATION
LOS: 1 days | Discharge: HOME | End: 2018-06-07
Payer: COMMERCIAL

## 2018-06-06 VITALS — BODY MASS INDEX: 35.5 KG/M2

## 2018-06-06 DIAGNOSIS — I25.10: ICD-10-CM

## 2018-06-06 DIAGNOSIS — I10: ICD-10-CM

## 2018-06-06 DIAGNOSIS — Z87.891: ICD-10-CM

## 2018-06-06 DIAGNOSIS — E78.5: ICD-10-CM

## 2018-06-06 DIAGNOSIS — Z95.5: ICD-10-CM

## 2018-06-06 DIAGNOSIS — E78.00: ICD-10-CM

## 2018-06-06 DIAGNOSIS — Z79.84: ICD-10-CM

## 2018-06-06 DIAGNOSIS — M54.2: ICD-10-CM

## 2018-06-06 DIAGNOSIS — E11.9: ICD-10-CM

## 2018-06-06 DIAGNOSIS — I25.2: ICD-10-CM

## 2018-06-06 DIAGNOSIS — R07.89: Primary | ICD-10-CM

## 2018-06-06 LAB
ALBUMIN SERPL-MCNC: 3.6 G/DL (ref 3–4.8)
ALBUMIN/GLOB SERPL: 1.1 {RATIO} (ref 1.1–1.8)
ALT SERPL-CCNC: 39 U/L (ref 7–56)
APTT BLD: 29.7 SECONDS (ref 25.1–36.5)
AST SERPL-CCNC: 23 U/L (ref 17–59)
BASOPHILS # BLD AUTO: 0.03 K/MM3 (ref 0–2)
BASOPHILS NFR BLD: 0.4 % (ref 0–3)
BNP SERPL-MCNC: 230 PG/ML (ref 0–450)
BUN SERPL-MCNC: 16 MG/DL (ref 7–21)
CALCIUM SERPL-MCNC: 8.6 MG/DL (ref 8.4–10.5)
EOSINOPHIL # BLD: 0.1 10*3/UL (ref 0–0.7)
EOSINOPHIL NFR BLD: 1.2 % (ref 1.5–5)
ERYTHROCYTE [DISTWIDTH] IN BLOOD BY AUTOMATED COUNT: 13.5 % (ref 11.5–14.5)
GFR NON-AFRICAN AMERICAN: 52
GRANULOCYTES # BLD: 5.07 10*3/UL (ref 1.4–6.5)
GRANULOCYTES NFR BLD: 66.7 % (ref 50–68)
HGB BLD-MCNC: 14.4 G/DL (ref 14–18)
INR PPP: 1.07 (ref 0.93–1.08)
LYMPHOCYTES # BLD: 1.9 10*3/UL (ref 1.2–3.4)
LYMPHOCYTES NFR BLD AUTO: 25.6 % (ref 22–35)
MCH RBC QN AUTO: 29.5 PG (ref 25–35)
MCHC RBC AUTO-ENTMCNC: 33.5 G/DL (ref 31–37)
MCV RBC AUTO: 88.1 FL (ref 80–105)
MONOCYTES # BLD AUTO: 0.5 10*3/UL (ref 0.1–0.6)
MONOCYTES NFR BLD: 6.1 % (ref 1–6)
PLATELET # BLD: 182 10^3/UL (ref 120–450)
PMV BLD AUTO: 9.3 FL (ref 7–11)
PROTHROMBIN TIME: 12.2 SECONDS (ref 9.4–12.5)
RBC # BLD AUTO: 4.88 10^6/UL (ref 3.5–6.1)
TROPONIN I SERPL-MCNC: < 0.01 NG/ML
WBC # BLD AUTO: 7.6 10^3/UL (ref 4.5–11)

## 2018-06-06 PROCEDURE — 36415 COLL VENOUS BLD VENIPUNCTURE: CPT

## 2018-06-06 PROCEDURE — 83036 HEMOGLOBIN GLYCOSYLATED A1C: CPT

## 2018-06-06 PROCEDURE — 84443 ASSAY THYROID STIM HORMONE: CPT

## 2018-06-06 PROCEDURE — 97116 GAIT TRAINING THERAPY: CPT

## 2018-06-06 PROCEDURE — 80053 COMPREHEN METABOLIC PANEL: CPT

## 2018-06-06 PROCEDURE — 96372 THER/PROPH/DIAG INJ SC/IM: CPT

## 2018-06-06 PROCEDURE — 84484 ASSAY OF TROPONIN QUANT: CPT

## 2018-06-06 PROCEDURE — 85610 PROTHROMBIN TIME: CPT

## 2018-06-06 PROCEDURE — 99285 EMERGENCY DEPT VISIT HI MDM: CPT

## 2018-06-06 PROCEDURE — 96374 THER/PROPH/DIAG INJ IV PUSH: CPT

## 2018-06-06 PROCEDURE — 71045 X-RAY EXAM CHEST 1 VIEW: CPT

## 2018-06-06 PROCEDURE — 85025 COMPLETE CBC W/AUTO DIFF WBC: CPT

## 2018-06-06 PROCEDURE — 85730 THROMBOPLASTIN TIME PARTIAL: CPT

## 2018-06-06 PROCEDURE — 83880 ASSAY OF NATRIURETIC PEPTIDE: CPT

## 2018-06-06 PROCEDURE — 83735 ASSAY OF MAGNESIUM: CPT

## 2018-06-06 PROCEDURE — 83615 LACTATE (LD) (LDH) ENZYME: CPT

## 2018-06-06 PROCEDURE — 82948 REAGENT STRIP/BLOOD GLUCOSE: CPT

## 2018-06-06 PROCEDURE — 93005 ELECTROCARDIOGRAM TRACING: CPT

## 2018-06-06 PROCEDURE — 97161 PT EVAL LOW COMPLEX 20 MIN: CPT

## 2018-06-06 PROCEDURE — 82550 ASSAY OF CK (CPK): CPT

## 2018-06-06 PROCEDURE — 80061 LIPID PANEL: CPT

## 2018-06-06 RX ADMIN — INSULIN HUMAN SCH: 100 INJECTION, SOLUTION PARENTERAL at 22:33

## 2018-06-06 NOTE — ED PDOC
Arrival/HPI





- General


Historian: Patient





<TrentThee - Last Filed: 06/06/18 16:45>





- History of Present Illness


Time/Duration: Prior to Arrival


Symptom Onset: Sudden


Symptom Course: Unchanged


Quality: Pressure, Tightness, Stabbing


Severity Level: Severe


Activities at Onset: Rest


Context: Work





<Tha Rivero - Last Filed: 06/06/18 22:31>





- General


Chief Complaint: Chest Pain


Time Seen by Provider: 06/06/18 15:10





- History of Present Illness


Narrative History of Present Illness (Text): 





Patient is a 58 year old male with past medical history of hypertension, 

hyperlipidemia, diabetes, and MI who presents to the ED for evaluation and 

treatment of right sided neck pain and chest pain which began approximately 2 

hours ago with no specific provoking event. States the pain began while eating. 

States the pain originates in the right lateral region of the neck and radiates 

to the retrosternal region and down the left upper extremity. Rated a 10/10 on 

onset and has improved to a 7/10 now without any acute intervention. Admits to 

associated  sweating and nausea. Denies fever, chills, headache, dizziness, LOC

, visual changes, SOB, abdominal pain, vomitting, diarrhea, constipation, and 

urinary symptoms. 








Past medical history: coronary artery disease x3 stents, DM, hypertension, 

hyperlipidemia


Past surgical history: varicose veins surgery


Allergies: denies 


Family History: DM and mother had gastric cancer 


Social: social ETOH use, denies tobacco use, denies any illicit drug use. Works 

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


Medications: please see MAR


Primary Medical Doctor: [facility] (Tracie Hernández)





06/06/18 16:45


 (Thee Newman)





Past Medical History





- Provider Review


Nursing Documentation Reviewed: Yes





- Travel History


Have you recently traveled outside [place] w/in the past 3 mons?: No





- Past History


Past History: Non-Contributing





- Infectious Disease


Hx of Infectious Diseases: None





- Cardiac


Hx Cardiac Disorders: Yes


Hx MI: Yes





- Pulmonary


Hx Respiratory Disorders: No





- Neurological


Hx Neurological Disorder: No





- HEENT


Hx HEENT Disorder: No





- Renal


Hx Renal Disorder: No





- Endocrine/Metabolic


Hx Endocrine Disorders: No


Hx Diabetes Mellitus Type 2: No





- Hematological/Oncological


Hx Blood Transfusions: No


Hx Blood Transfusion Reaction: No





- Integumentary


Hx Dermatological Disorder: No





- Musculoskeletal/Rheumatological


Hx Musculoskeletal Disorders: Yes





- Gastrointestinal


Hx Gastrointestinal Disorders: No





- Genitourinary/Gynecological


Hx Genitourinary Disorders: No





- Psychiatric


Hx Psychophysiologic Disorder: No


Hx Substance Use: No





- Surgical History


Hx Cardiac Catheterization: Yes


Other/Comment: coronary stent





- Anesthesia


Hx Anesthesia: Yes


Hx Anesthesia Reactions: No


Hx Malignant Hyperthermia: No





- Suicidal Assessment


Feels Threatened In Home Enviroment: No





<Thee Newman - Last Filed: 06/06/18 16:45>





Family/Social History





- Physician Review


Nursing Documentation Reviewed: Yes


Family/Social History: Diabetes


Smoking Status: Former Smoker


Hx Alcohol Use: Yes (social)


Frequency of alcohol use: Socially


Hx Substance Use: No





<NewmanThee - Last Filed: 06/06/18 16:45>





Allergies/Home Meds





<NewmanThee - Last Filed: 06/06/18 16:45>





<RiveroTha - Last Filed: 06/06/18 22:31>


Allergies/Adverse Reactions: 


Allergies





No Known Allergies Allergy (Verified 04/10/18 22:57)


 








Home Medications: 


 Home Meds











 Medication  Instructions  Recorded  Confirmed


 


metFORMIN [glucOPHAGE] 500 mg PO BID 04/10/18 04/10/18














Review of Systems





- Physician Review


All systems were reviewed & negative as marked: Yes





- Review of Systems


Constitutional: Normal


Eyes: Normal


ENT: Normal


Respiratory: Normal


Cardiovascular: Chest Pain.  absent: Palpitations


Gastrointestinal: Nausea


Genitourinary Male: Normal


Musculoskeletal: Normal


Skin: Normal


Neurological: Normal


Endocrine: Normal


Hemo/Lymphatic: Normal


Psychiatric: Normal





<Thee Newman - Last Filed: 06/06/18 16:45>





Physical Exam





- Systems Exam


Head: Present: Atraumatic, Normocephalic


Pupils: Present: PERRL


Extroacular Muscles: Present: EOMI


Conjunctiva: Present: Normal


Ears: Present: Normal


Mouth: Present: Moist Mucous Membranes


Neck: Present: Normal Range of Motion


Respiratory/Chest: Present: Clear to Auscultation, Good Air Exchange.  No: 

Respiratory Distress, Accessory Muscle Use


Cardiovascular: Present: Regular Rate and Rhythm, Normal S1, S2


Abdomen: Present: Normal Bowel Sounds.  No: Tenderness


Upper Extremity: Present: Normal Inspection


Lower Extremity: Present: Normal Inspection


Neurological: Present: GCS=15, CN II-XII Intact, Speech Normal, Motor Func 

Grossly Intact, Normal Sensory Function


Skin: Present: Warm, Dry


Psychiatric: Present: Alert, Oriented x 3, Normal Insight, Normal Concentration





<Thee Newman - Last Filed: 06/06/18 16:45>


Vital Signs











  Temp Pulse Resp BP Pulse Ox


 


 06/06/18 20:26  98.0 F  75  17  130/75  98


 


 06/06/18 20:25   75   130/75 


 


 06/06/18 20:16   73  16  130/75  99


 


 06/06/18 18:45  98.0 F  60  18  113/70  99


 


 06/06/18 14:56  98.6 F  96 H  20  137/76  99














Medical Decision Making





<Thee Newman - Last Filed: 06/06/18 16:45>


Re-evaluation Time: 16:00


Reassessment Condition: Improved





- Critical Care


Critical Care Minutes: 30 minutes


Critical Care Time: Excluding Proc Time





- Lab Interpretations


I have reviewed the lab results: Yes


Interpretation: All labs normal





- RAD Interpretation


: Radiologist





- EKG Interpretation


Interpreted by ED Physician: Yes


Type: 12 lead EKG


Comparison: Similar to previous EKG





<Tha Rivero - Last Filed: 06/06/18 22:31>


ED Course and Treatment: 





Assessment and Plan:


Patient is a 58 year old male with past medical history of hypertension, 

hyperlipidemia, diabetes, and MI who presents to the ED for evaluation and 

treatment of right sided neck pain and chest pain.





Chest Pain rule out ACS


Neck Pain


Hx of MI


Hx of HTN


Hx of HPL


Hx of DM





06/06/18 15:12:


- previous diagnostic cardiac cath completed on 4/2018, procedure revealed 

patent stents in the LAD and RCA with 50% stenosis in the proximal RCA


- cardiac iso, EKG, CXR


- CBC, CMP


- BNP


- PT/PTT, INR


- aspirin 325mg x 1


- nitro


- zofran


06/06/18 16:00:


- first troponin negative, recommend trending x 3 


- BNP WNL


- CXR reviewed and appreciated- under penetrated film, No significant pleural 

effusion identified, no pneumothorax apparent


06/06/18 16:41:


- chest pain/neck pain significantly improved from baseline- rated 0/10


- patient endorsed to spoke with hospitalist who has accepted the patient to 

their service





06/06/18 16:49


 (Thee Newman)





Patient Seen With Resident:


In agreement with resident note which contains more details about the patient. 

Patient was seen and evaluated with resident. Came up with plan and treatment 

together.





I performed the hx and physical exam of the patient and discussed their mgt 

with the RESIDENT. I reviewed the RESIDENT's NOTE and agree with the assessment 

and plan of care.





pt is currently comfortable


pt states chest pain is now 2/10





pt/daughter are made aware of pt's medical results


agrees with admission





 (Tha Rivero)





- Critical Care


Narrative Critical Care (Text): 





06/06/18 22:27


critical care time: 30min, excluding procedure time, excluding time teaching 

residents/students/mid-level providers; including initial eval/diagnosis, 

diagnostic interpretation, re-eval, consultations, final disposition (Tha Rivero)





- Lab Interpretations


Lab Results: 








 06/06/18 15:49 





 06/06/18 15:49 





 Lab Results





06/06/18 15:49: PT 12.2, INR 1.07, APTT 29.7


06/06/18 15:49: Sodium 146, Potassium 4.7, Chloride 110 H, Carbon Dioxide 23, 

Anion Gap 18, BUN 16, Creatinine 1.4, Est GFR (African Amer) > 60, Est GFR (Non-

Af Amer) 52, Random Glucose 80, Calcium 8.6, Magnesium 1.8, Total Bilirubin 0.4

, AST 23, ALT 39, Alkaline Phosphatase 117, Lactate Dehydrogenase 572, Total 

Creatine Kinase 219, Troponin I < 0.01  D, NT-Pro-B Natriuret Pep 230, Total 

Protein 6.8, Albumin 3.6, Globulin 3.2, Albumin/Globulin Ratio 1.1


06/06/18 15:49: WBC 7.6  D, RBC 4.88, Hgb 14.4, Hct 43.0, MCV 88.1, MCH 29.5, 

MCHC 33.5, RDW 13.5, Plt Count 182, MPV 9.3, Gran % 66.7, Lymph % (Auto) 25.6, 

Mono % (Auto) 6.1 H, Eos % (Auto) 1.2 L, Baso % (Auto) 0.4, Gran # 5.07, Lymph 

# (Auto) 1.9, Mono # (Auto) 0.5, Eos # (Auto) 0.1, Baso # (Auto) 0.03











- RAD Interpretation


Narrative RAD Interpretations (Text): 





06/06/18 22:27


HISTORY:


chest pain  





COMPARISON:


04/10/2018 





FINDINGS:





LUNGS:


No active pulmonary disease.





PLEURA:


No significant pleural effusion identified, no pneumothorax apparent.





CARDIOVASCULAR:


Normal.





OSSEOUS STRUCTURES:


No significant abnormalities.





VISUALIZED UPPER ABDOMEN:


Normal.





OTHER FINDINGS:


None.





IMPRESSION:


No active disease. (Tha Rivero)


Radiology Orders: 








06/06/18 15:13


CHEST PORTABLE [RAD] Stat 














- EKG Interpretation


EKG Interpretation (Text): 





06/06/18 15:45





Normal Sinus Rhythm HR 98 bpm, Qtc 431ms, peaked T waves in V2, Q waves present

, T wave inversions in III and AVF





06/06/18 15:57


 (Thee Newman)





- Medication Orders


Current Medication Orders: 








Aspirin (Aspirin Chewable)  81 mg PO DAILY [facility]


Atorvastatin Calcium (Lipitor)  80 mg PO DAILY [facility]


Clopidogrel Bisulfate (Plavix)  75 mg PO STAT [facility]


   Last Admin: 06/06/18 18:34  Dose: 75 mg





Clopidogrel Bisulfate (Plavix)  75 mg PO DAILY [facility]


Insulin Human Regular (Humulin R Low)  0 units SC ACHS TJ


   PRN Reason: Protocol


Lisinopril (Zestril)  2.5 mg PO DAILY [facility]


Metoprolol Tartrate (Lopressor)  25 mg PO BID [facility]


   Last Admin: 06/06/18 20:25  Dose: 25 mg





MAR Pulse and Blood Pressure


 Document     06/06/18 20:25  EQ  (Rec: 06/06/18 20:25  EQ  [id number])


     Pulse


      Pulse Rate (60-90)                         75


     Blood Pressure


      Blood Pressure (100//90)             130/75





Ondansetron HCl (Zofran Inj)  4 mg IVP Q6H PRN


   PRN Reason: Nausea/Vomiting


Pantoprazole Sodium (Protonix Ec Tab)  40 mg PO 0600 [facility]





Discontinued Medications





Aspirin (Aspirin)  325 mg PO STAT [facility]


   Stop: 06/06/18 15:13


   Last Admin: 06/06/18 15:48  Dose: 325 mg





Atorvastatin Calcium (Lipitor)  40 mg PO DIN [facility]


   Last Admin: 06/06/18 18:34  Dose: 40 mg





Heparin Sodium (Porcine) (Heparin)  5,000 units SC STAT [facility]


   PRN Reason: Protocol


   Stop: 06/06/18 17:27


   Last Admin: 06/06/18 18:33  Dose: 5,000 units





MAR aPTT


 Document     06/06/18 18:33  CMA  (Rec: 06/06/18 18:34  [facility]-[id number])


     aPTT


      aPTT (secs)                                29


Subcutaneous Administrations


 Document     06/06/18 18:33  CMA  (Rec: 06/06/18 18:34  [id number]


     Injection Site


      MAR Injection Site                         Right Deltoid


     Charges for Administration


      # of Subcutaneous Administrations          1





Nitroglycerin (Nitro-Bid 2% Oint)  1 ea TOP STAT [facility]


   Stop: 06/06/18 15:13


   Last Admin: 06/06/18 15:48  Dose: 1 ea





Ondansetron HCl (Zofran Inj)  4 mg IVP STAT STA


   Stop: 06/06/18 15:15


   Last Admin: 06/06/18 15:49  Dose: 4 mg





IVP Administration


 Document     06/06/18 15:49  EQ  (Rec: 06/06/18 15:49  EQ  [id number])


     Charges for Administration


      # of IVP Administrations                   1














Disposition/Present on Arrival





- Present on Arrival


Any Indicators Present on Arrival: No


History of DVT/PE: No


History of Uncontrolled Diabetes: No


Urinary Catheter: No


History Surgical Site Infection Following: None





- Disposition


Have Diagnosis and Disposition been Completed?: Yes


Disposition Time: 16:48





<Thee Newman - Last Filed: 06/06/18 16:45>





- Present on Arrival


History of Decub. Ulcer: No





- Disposition


Patient Plan: Admission, Telemetry





<Tha Rivero - Last Filed: 06/06/18 22:31>





- Disposition


Diagnosis: 


 Chest pain, Chest pain with moderate risk for cardiac etiology





Disposition: HOSPITALIZED


Patient Problems: 


 Current Active Problems











Problem Status Onset


 


Chest pain Acute  


 


Chest pain with moderate risk for cardiac etiology Acute  











Condition: IMPROVED

## 2018-06-06 NOTE — RAD
HISTORY:

chest pain  



COMPARISON:

04/10/2018 



FINDINGS:



LUNGS:

No active pulmonary disease.



PLEURA:

No significant pleural effusion identified, no pneumothorax apparent.



CARDIOVASCULAR:

Normal.



OSSEOUS STRUCTURES:

No significant abnormalities.



VISUALIZED UPPER ABDOMEN:

Normal.



OTHER FINDINGS:

None.



IMPRESSION:

No active disease.

## 2018-06-06 NOTE — CP.PCM.HP
<Denilson Miramontes - Last Filed: 06/06/18 19:19>





History of Present Illness





- History of Present Illness


History of Present Illness: 








Patient is a 58 year old male with past medical history of hypertension, 

hyperlipidemia, diabetes, and MI who presents to the ED for evaluation and 

treatment of right sided neck pain and chest pain which began approximately 1 

p.m. today while eating. Patient states a similar episode occurred on Sunday 

for which he did not take any medication as it was self resolving. States the 

pain originates in the right lateral region of the neck and radiates to the 

retrosternal region and down the left upper extremity. Describes the pain as 

sharp rating it a 10/10.  Admits to associated  sweating and nausea. Patient 

also endorses as of late he has become noticeably short of breath when riding 

his bicycle, causing him to have to take breaks every 10 minutes. Denies fever, 

chills, headache, dizziness, LOC, visual changes, SOB, abdominal pain, vomiting

, diarrhea, constipation, and urinary symptoms. 





Primary Medical Doctor: [facility] (Tracie Hernández)


Past medical history: coronary artery disease x3 BHUMIKA stents, NIDDM, hypertension

, hyperlipidemia


Past surgical history: varicose veins surgery


Allergies: denies 


Family History: DM and mother had gastric cancer 


Social: social ETOH use, denies tobacco use, denies any illicit drug use. Works 

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


Medications: please see MAR








Present on Admission





- Present on Admission


Any Indicators Present on Admission: No





Review of Systems





- Constitutional


Constitutional: absent: Chills, Fever, Headache





- EENT


Eyes: absent: Blurred Vision


Ears: absent: Dizziness





- Cardiovascular


Cardiovascular: Chest Pain, Dyspnea.  absent: Edema





- Respiratory


Respiratory: absent: Cough, Dyspnea





- Gastrointestinal


Gastrointestinal: absent: Abdominal Pain, Diarrhea, Nausea, Vomiting





- Neurological


Neurological: absent: Dizziness, Numbness, Headaches, Syncope, Weakness





- Psychiatric


Psychiatric: absent: Confusion





- Endocrine


Endocrine: absent: Fatigue





Past Patient History





- Infectious Disease


Hx of Infectious Diseases: None





- Past Social History


Smoking Status: Former Smoker





- CARDIAC


Hx Cardiac Disorders: Yes


Hx Heart Attack: Yes





- PULMONARY


Hx Respiratory Disorders: No





- NEUROLOGICAL


Hx Neurological Disorder: No





- HEENT


Hx HEENT Problems: No





- RENAL


Hx Chronic Kidney Disease: No





- ENDOCRINE/METABOLIC


Hx Endocrine Disorders: No


Hx Diabetes Mellitus Type 2: No





- HEMATOLOGICAL/ONCOLOGICAL


Hx Blood Transfusions: No


Hx Blood Transfusion Reaction: No





- INTEGUMENTARY


Hx Dermatological Problems: No





- MUSCULOSKELETAL/RHEUMATOLOGICAL


Hx Musculoskeletal Disorders: Yes





- GASTROINTESTINAL


Hx Gastrointestinal Disorders: No





- GENITOURINARY/GYNECOLOGICAL


Hx Genitourinary Disorders: No





- PSYCHIATRIC


Hx Psychophysiologic Disorder: No


Hx Substance Use: No





- SURGICAL HISTORY


Hx Cardiac Catheterization: Yes


Other/Comment: coronary stent





- ANESTHESIA


Hx Anesthesia: Yes


Hx Anesthesia Reactions: No


Hx Malignant Hyperthermia: No





Meds


Allergies/Adverse Reactions: 


 Allergies











Allergy/AdvReac Type Severity Reaction Status Date / Time


 


No Known Allergies Allergy   Verified 04/10/18 22:57














Physical Exam





- Constitutional


Appears: No Acute Distress





- Head Exam


Head Exam: ATRAUMATIC, NORMAL INSPECTION, NORMOCEPHALIC





- Eye Exam


Eye Exam: EOMI, Normal appearance





- ENT Exam


ENT Exam: Mucous Membranes Moist, Normal Exam





- Neck Exam


Neck exam: Positive for: Normal Inspection





- Respiratory Exam


Respiratory Exam: Clear to Auscultation Bilateral, NORMAL BREATHING PATTERN.  

absent: Rales, Rhonchi, Wheezes





- Cardiovascular Exam


Cardiovascular Exam: REGULAR RHYTHM, +S1, +S2





- GI/Abdominal Exam


GI & Abdominal Exam: Normal Bowel Sounds, Soft





- Extremities Exam


Extremities exam: Positive for: normal inspection





- Back Exam


Back exam: NORMAL INSPECTION





- Neurological Exam


Neurological exam: Alert, CN II-XII Intact, Oriented x3





- Psychiatric Exam


Psychiatric exam: Normal Affect, Normal Mood





- Skin


Skin Exam: Normal Color, Warm





Results





- Vital Signs


Recent Vital Signs: 





 Last Vital Signs











Temp  98.6 F   06/06/18 14:56


 


Pulse  96 H  06/06/18 14:56


 


Resp  20   06/06/18 14:56


 


BP  137/76   06/06/18 14:56


 


Pulse Ox  99   06/06/18 14:56














- Labs


Result Diagrams: 


 06/06/18 15:49





 06/06/18 15:49





Assessment & Plan





- Assessment and Plan (Free Text)


Assessment: 





Patient is a 58 year old male with past medical history of hypertension, 

hyperlipidemia, diabetes, and MI who presents to the ED for evaluation and 

treatment of right sided neck pain and chest pain.





Chest Pain rule out ACS


-Cardiology consulted


-Previous diagnostic cardiac cath completed on 4/2018, procedure revealed 

patent stents in the LAD and RCA with 50% stenosis in the proximal RCA


-First set of trops neg, continue with serial troponins


-EKG series; monitor for changes. Current EKG similar to previous


-Lipid panel, CBC, CMP, TSH, HgA1c, PT/PTT


-Aspirin 325mg x 1, nitro given, Clopidogrel. Start daily aspirin, lipitor, 

metoprolol, lisinopril, clopidogrel


-Zofran PRN for nausea


-Echo ordered





Hypertension


-Monitor vitals


-Metoprolol and Lisinopril started





NIDDM


-Insulin SCC


-accuchecks q6h


-HgA1C ordered





Hyperlipidemia


-Atorvastatin started


-Lipid panel ordered








GI/DVT ppx: Protonix/Heparin





<Brooks Rosenberg - Last Filed: 06/07/18 09:21>





Results





- Vital Signs


Recent Vital Signs: 





 Last Vital Signs











Temp  98.3 F   06/07/18 05:47


 


Pulse  54 L  06/07/18 05:47


 


Resp  20   06/07/18 05:47


 


BP  109/69   06/07/18 05:47


 


Pulse Ox  100   06/07/18 05:47














- Labs


Result Diagrams: 


 06/07/18 05:30





 06/07/18 05:30


Labs: 





 Laboratory Results - last 24 hr











  06/06/18 06/07/18 06/07/18





  20:33 05:30 05:30


 


WBC    8.4


 


RBC    4.53


 


Hgb    12.9 L


 


Hct    40.5 L


 


MCV    89.4


 


MCH    28.5


 


MCHC    31.9


 


RDW    13.7


 


Plt Count    188


 


MPV    9.5


 


Gran %    63.6


 


Lymph % (Auto)    27.9


 


Mono % (Auto)    6.3 H


 


Eos % (Auto)    2.0


 


Baso % (Auto)    0.2


 


Gran #    5.33


 


Lymph # (Auto)    2.3


 


Mono # (Auto)    0.5


 


Eos # (Auto)    0.2


 


Baso # (Auto)    0.02


 


PT   12.6 H 


 


INR   1.09 H 


 


APTT   28.8 


 


Sodium   


 


Potassium   


 


Chloride   


 


Carbon Dioxide   


 


Anion Gap   


 


BUN   


 


Creatinine   


 


Est GFR ( Amer)   


 


Est GFR (Non-Af Amer)   


 


POC Glucose (mg/dL)   


 


Random Glucose   


 


Calcium   


 


Total Bilirubin   


 


AST   


 


ALT   


 


Alkaline Phosphatase   


 


Troponin I   


 


Total Protein   


 


Albumin   


 


Globulin   


 


Albumin/Globulin Ratio   


 


Triglycerides   


 


Cholesterol   


 


LDL Cholesterol Direct   


 


HDL Cholesterol   


 


TSH 3rd Generation  1.68  














  06/07/18 06/07/18





  05:30 07:37


 


WBC  


 


RBC  


 


Hgb  


 


Hct  


 


MCV  


 


MCH  


 


MCHC  


 


RDW  


 


Plt Count  


 


MPV  


 


Gran %  


 


Lymph % (Auto)  


 


Mono % (Auto)  


 


Eos % (Auto)  


 


Baso % (Auto)  


 


Gran #  


 


Lymph # (Auto)  


 


Mono # (Auto)  


 


Eos # (Auto)  


 


Baso # (Auto)  


 


PT  


 


INR  


 


APTT  


 


Sodium  141 


 


Potassium  4.8 


 


Chloride  107 


 


Carbon Dioxide  28 


 


Anion Gap  11 


 


BUN  17 


 


Creatinine  0.9 


 


Est GFR ( Amer)  > 60 


 


Est GFR (Non-Af Amer)  > 60 


 


POC Glucose (mg/dL)   87


 


Random Glucose  92 


 


Calcium  8.6 


 


Total Bilirubin  0.5 


 


AST  26 


 


ALT  38 


 


Alkaline Phosphatase  100 


 


Troponin I  0.03  D 


 


Total Protein  6.4 


 


Albumin  3.3 


 


Globulin  3.1 


 


Albumin/Globulin Ratio  1.1 


 


Triglycerides  55 


 


Cholesterol  78 L 


 


LDL Cholesterol Direct  40 


 


HDL Cholesterol  32 


 


TSH 3rd Generation  














Attending/Attestation





- Attestation


I have personally seen and examined this patient.: Yes


I have fully participated in the care of the patient.: Yes


I have reviewed all pertinent clinical information: Yes


Notes (Text): 





Patient seen and examined with the residents. Agree with above


History of CAD with PCI in the past and thus symptoms worrisome for ACS


Further evaluation, diagnostics and/or intervention as hospital course 

progresses based on clinical and lab findings.


Cardio consultation for their expert opinion appreciated

## 2018-06-07 VITALS
TEMPERATURE: 97.1 F | HEART RATE: 84 BPM | SYSTOLIC BLOOD PRESSURE: 111 MMHG | DIASTOLIC BLOOD PRESSURE: 74 MMHG | RESPIRATION RATE: 21 BRPM

## 2018-06-07 VITALS — OXYGEN SATURATION: 100 %

## 2018-06-07 LAB
ALBUMIN SERPL-MCNC: 3.3 G/DL (ref 3–4.8)
ALBUMIN/GLOB SERPL: 1.1 {RATIO} (ref 1.1–1.8)
ALT SERPL-CCNC: 38 U/L (ref 7–56)
APTT BLD: 28.8 SECONDS (ref 25.1–36.5)
AST SERPL-CCNC: 26 U/L (ref 17–59)
BASOPHILS # BLD AUTO: 0.02 K/MM3 (ref 0–2)
BASOPHILS NFR BLD: 0.2 % (ref 0–3)
BUN SERPL-MCNC: 17 MG/DL (ref 7–21)
CALCIUM SERPL-MCNC: 8.6 MG/DL (ref 8.4–10.5)
EOSINOPHIL # BLD: 0.2 10*3/UL (ref 0–0.7)
EOSINOPHIL NFR BLD: 2 % (ref 1.5–5)
ERYTHROCYTE [DISTWIDTH] IN BLOOD BY AUTOMATED COUNT: 13.7 % (ref 11.5–14.5)
GFR NON-AFRICAN AMERICAN: > 60
GRANULOCYTES # BLD: 5.33 10*3/UL (ref 1.4–6.5)
GRANULOCYTES NFR BLD: 63.6 % (ref 50–68)
HDLC SERPL-MCNC: 32 MG/DL (ref 29–60)
HGB BLD-MCNC: 12.9 G/DL (ref 14–18)
INR PPP: 1.09 (ref 0.93–1.08)
LDLC SERPL-MCNC: 40 MG/DL (ref 0–129)
LYMPHOCYTES # BLD: 2.3 10*3/UL (ref 1.2–3.4)
LYMPHOCYTES NFR BLD AUTO: 27.9 % (ref 22–35)
MCH RBC QN AUTO: 28.5 PG (ref 25–35)
MCHC RBC AUTO-ENTMCNC: 31.9 G/DL (ref 31–37)
MCV RBC AUTO: 89.4 FL (ref 80–105)
MONOCYTES # BLD AUTO: 0.5 10*3/UL (ref 0.1–0.6)
MONOCYTES NFR BLD: 6.3 % (ref 1–6)
PLATELET # BLD: 188 10^3/UL (ref 120–450)
PMV BLD AUTO: 9.5 FL (ref 7–11)
PROTHROMBIN TIME: 12.6 SECONDS (ref 9.4–12.5)
RBC # BLD AUTO: 4.53 10^6/UL (ref 3.5–6.1)
TROPONIN I SERPL-MCNC: 0.03 NG/ML
WBC # BLD AUTO: 8.4 10^3/UL (ref 4.5–11)

## 2018-06-07 RX ADMIN — INSULIN HUMAN SCH: 100 INJECTION, SOLUTION PARENTERAL at 08:02

## 2018-06-07 RX ADMIN — INSULIN HUMAN SCH: 100 INJECTION, SOLUTION PARENTERAL at 11:30

## 2018-06-07 NOTE — CARD
--------------- APPROVED REPORT --------------





EKG Measurement

Heart [id number]

VT [id number]

[id number]

[id number]

[id number]



<Conclusion>

Normal sinus rhythm

Inferior infarct, old

Mild NSSTW changes

## 2018-06-07 NOTE — CARD
--------------- APPROVED REPORT --------------





EKG Measurement

Heart [id number]

FL [id number]

[id number]

[id number]

[id number]



<Conclusion>

Sinus bradycardia, new

Possible Inferior infarct, old

## 2018-06-07 NOTE — CON
DATE:  06/07/2018



CARDIOLOGY CONSULTATION



HISTORY OF PRESENT ILLNESS:  The patient is a 58-year-old male with

multiple cardiac risk factors including hypertension and

hypercholesterolemia as well and history of PTCA and stent in the past, who

presents with a transient neck pain.  In addition, he suffers from diabetes

mellitus.



No chest pain noted.  No dyspnea noted.



The patient underwent a cardiac catheterization one month ago, which

revealed patent stents in the LAD and RCA with a 50% stenosis in the RCA,

which is noncritical.  His overall ejection fraction of 45% to 50%.



Currently, the patient is asymptomatic.



SOCIAL HISTORY:  He denies smoking.



The patient is ambulating on the floor without symptoms.



REVIEW OF SYSTEMS:  A 14-point review of systems was all negative for

cardiac symptomatology.



PHYSICAL EXAMINATION:

VITAL SIGNS:  Blood pressure is 112/72, the heart rates in the 60s, normal

sinus rhythm.

NECK:  Negative JVD.

LUNGS:  Without rales.

HEART:  S1, S2.

EXTREMITIES:  Without edema.



EKG shows normal sinus rhythm with nonspecific ST-T changes.



LABORATORY DATA:  Troponin's are negative x2.  The hemoglobin is 12.9.



IMPRESSION:

1.  Neck pain.

2.  No evidence for acute coronary syndrome.

3.  History of percutaneous transluminal coronary angioplasty and stent in

the past.

4.  Hypertension.

5.  Diabetes mellitus.

6.  Hypercholesterolemia.

7.  Good left ventricular function on previous catheterization.



Given these findings, there is no evidence for acute coronary syndrome. 

The patient can be discharged today.  Followup stress test and outpatient

followup have been discussed with the patient in detail.







__________________________________________

Kenji Geiger MD



DD:  06/07/2018 10:24:57

DT:  06/07/2018 10:28:21

Job # [id number]

## 2018-06-07 NOTE — CP.PCM.DIS
<Alejandro Gibson - Last Filed: 18 15:58>





Provider





- Provider


Date of Admission: 


18 17:14





Attending physician: 


Brooks Rosenberg





Primary care physician: 


Tracie MEANS





Time Spent in preparation of Discharge (in minutes): 45





Hospital Course





- Lab Results


Lab Results: 


 Most Recent Lab Values











WBC  8.4 10^3/ul (4.5-11.0)   18  05:30    


 


RBC  4.53 10^6/uL (3.5-6.1)   18  05:30    


 


Hgb  12.9 g/dL (14.0-18.0)  L  18  05:30    


 


Hct  40.5 % (42.0-52.0)  L  18  05:30    


 


MCV  89.4 fl (80.0-105.0)   18  05:30    


 


MCH  28.5 pg (25.0-35.0)   18  05:30    


 


MCHC  31.9 g/dl (31.0-37.0)   18  05:30    


 


RDW  13.7 % (11.5-14.5)   18  05:30    


 


Plt Count  188 10^3/uL (120.0-450.0)   18  05:30    


 


MPV  9.5 fl (7.0-11.0)   18  05:30    


 


Gran %  63.6 % (50.0-68.0)   18  05:30    


 


Lymph % (Auto)  27.9 % (22.0-35.0)   18  05:30    


 


Mono % (Auto)  6.3 % (1.0-6.0)  H  18  05:30    


 


Eos % (Auto)  2.0 % (1.5-5.0)   18  05:30    


 


Baso % (Auto)  0.2 % (0.0-3.0)   18  05:30    


 


Gran #  5.33  (1.4-6.5)   18  05:30    


 


Lymph # (Auto)  2.3  (1.2-3.4)   18  05:30    


 


Mono # (Auto)  0.5  (0.1-0.6)   18  05:30    


 


Eos # (Auto)  0.2  (0.0-0.7)   18  05:30    


 


Baso # (Auto)  0.02 K/mm3 (0.0-2.0)   18  05:30    


 


PT  12.6 SECONDS (9.4-12.5)  H  18  05:30    


 


INR  1.09  (0.93-1.08)  H  18  05:30    


 


APTT  28.8 Seconds (25.1-36.5)   18  05:30    


 


Sodium  141 mmol/L (132-148)   18  05:30    


 


Potassium  4.8 mmol/L (3.6-5.0)   18  05:30    


 


Chloride  107 mmol/L ()   18  05:30    


 


Carbon Dioxide  28 mmol/L (21-33)   18  05:30    


 


Anion Gap  11  (10-20)   18  05:30    


 


BUN  17 mg/dL (7-21)   18  05:30    


 


Creatinine  0.9 mg/dl (0.8-1.5)   18  05:30    


 


Est GFR ( Amer)  > 60   18  05:30    


 


Est GFR (Non-Af Amer)  > 60   18  05:30    


 


POC Glucose (mg/dL)  105 mg/dL ()   18  10:59    


 


Random Glucose  92 mg/dL ()   18  05:30    


 


Hemoglobin A1c  6.1 % (4.2-6.5)   18  20:33    


 


Calcium  8.6 mg/dL (8.4-10.5)   18  05:30    


 


Magnesium  1.8 mg/dL (1.7-2.2)   18  15:49    


 


Total Bilirubin  0.5 mg/dL (0.2-1.3)   18  05:30    


 


AST  26 U/L (17-59)   18  05:30    


 


ALT  38 U/L (7-56)   18  05:30    


 


Alkaline Phosphatase  100 U/L ()   18  05:30    


 


Lactate Dehydrogenase  572 U/L (333-699)   18  15:49    


 


Total Creatine Kinase  219 U/L ()   18  15:49    


 


Troponin I  0.02 ng/mL D 18  11:30    


 


NT-Pro-B Natriuret Pep  230 pg/mL (0-450)   18  15:49    


 


Total Protein  6.4 g/dL (5.8-8.3)   18  05:30    


 


Albumin  3.3 g/dL (3.0-4.8)   18  05:30    


 


Globulin  3.1 gm/dL  18  05:30    


 


Albumin/Globulin Ratio  1.1  (1.1-1.8)   18  05:30    


 


Triglycerides  55 mg/dL ()   18  05:30    


 


Cholesterol  78 mg/dL (130-200)  L  18  05:30    


 


LDL Cholesterol Direct  40 mg/dL (0-129)   18  05:30    


 


HDL Cholesterol  32 mg/dL (29-60)   18  05:30    


 


TSH 3rd Generation  1.68 mIU/mL (0.46-4.68)   18  20:33    














- Hospital Course


Hospital Course: 





Patient is a 58 year old male with past medical history of hypertension, 

hyperlipidemia, diabetes, and MI who presents to the ED for evaluation and 

treatment of right sided neck pain and chest pain which began approximately 1 

p.m. today while eating. Patient states a similar episode occurred on  

for which he did not take any medication as it was self resolving. States the 

pain originates in the right lateral region of the neck and radiates to the 

retrosternal region and down the left upper extremity. Describes the pain as 

sharp rating it a 10/10.  Admits to associated  sweating and nausea. Patient 

also endorses as of late he has become noticeably short of breath when riding 

his bicycle, causing him to have to take breaks every 10 minutes. Denies fever, 

chills, headache, dizziness, LOC, visual changes, SOB, abdominal pain, vomiting

, diarrhea, constipation, and urinary symptoms. 





Primary Medical Doctor: [facility] (Tracie Hernández)


Past medical history: coronary artery disease x3 BHUMIKA stents, NIDDM, hypertension

, hyperlipidemia


Past surgical history: varicose veins surgery


Allergies: denies 


Family History: DM and mother had gastric cancer 


Social: social ETOH use, denies tobacco use, denies any illicit drug use. Works 

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


Medications: please see MAR








Hospital Course:


Patient was admitted for observation for Telemetry.  Troponins were ordered and 

trended. TSH was within normal limits The patient was then seen by Cardiology 

while admitted also. Troponins were all negative upon results.  Lipid panel was 

within normal limits.  Patient was seen and examined and determined that the 

patient was stable to go home.  Patient was discharged with the following 

instructions.











Imagin. Chest xray: no active disease





2. EKG: sinus bradycardia, new. possible inferior infarct,old











Discharge Instructions:


1.F/u with PMD within one week of discharge.


2. F/u with Dr. Geiger Card[facility] for outpatient stress test Monday and 

Echocardiogram.


3. Return to hospital for any new or worsening symptoms.





Discharge Exam





- Head Exam


Head Exam: ATRAUMATIC, NORMAL INSPECTION, NORMOCEPHALIC





Discharge Plan





- Discharge Medications


Prescriptions: 


Cyclobenzaprine [Cyclobenzaprine HCl] 10 mg PO BID #6 tab





- Follow Up Plan


Condition: IMPROVED


Disposition: HOME/ ROUTINE


Instructions:  Type 2 Diabetes, Heart Healthy Diet, Chest Pain That Is Not 

Caused by the Heart (DC), Chest Pain (DC)


Additional Instructions: 


1.F/u with PMD within one week of discharge.


2. F/u with Dr. Geiger [facility] for outpatient stress test Monday and 

Echocardiogram.


3. Return to hospital for any new or worsening symptoms.


Referrals: 


Tracie Hernández APN-C [Primary Care Provider] - 





<Brooks Rosenberg - Last Filed: 18 16:31>





Provider





- Provider


Date of Admission: 


18 17:14





Attending physician: 


Brooks Rosenberg





Primary care physician: 


Tracie MEANS








Hospital Course





- Lab Results


Lab Results: 


 Most Recent Lab Values











WBC  8.4 10^3/ul (4.5-11.0)   18  05:30    


 


RBC  4.53 10^6/uL (3.5-6.1)   18  05:30    


 


Hgb  12.9 g/dL (14.0-18.0)  L  18  05:30    


 


Hct  40.5 % (42.0-52.0)  L  18  05:30    


 


MCV  89.4 fl (80.0-105.0)   18  05:30    


 


MCH  28.5 pg (25.0-35.0)   18  05:30    


 


MCHC  31.9 g/dl (31.0-37.0)   18  05:30    


 


RDW  13.7 % (11.5-14.5)   18  05:30    


 


Plt Count  188 10^3/uL (120.0-450.0)   18  05:30    


 


MPV  9.5 fl (7.0-11.0)   18  05:30    


 


Gran %  63.6 % (50.0-68.0)   18  05:30    


 


Lymph % (Auto)  27.9 % (22.0-35.0)   18  05:30    


 


Mono % (Auto)  6.3 % (1.0-6.0)  H  18  05:30    


 


Eos % (Auto)  2.0 % (1.5-5.0)   18  05:30    


 


Baso % (Auto)  0.2 % (0.0-3.0)   18  05:30    


 


Gran #  5.33  (1.4-6.5)   18  05:30    


 


Lymph # (Auto)  2.3  (1.2-3.4)   18  05:30    


 


Mono # (Auto)  0.5  (0.1-0.6)   18  05:30    


 


Eos # (Auto)  0.2  (0.0-0.7)   18  05:30    


 


Baso # (Auto)  0.02 K/mm3 (0.0-2.0)   18  05:30    


 


PT  12.6 SECONDS (9.4-12.5)  H  18  05:30    


 


INR  1.09  (0.93-1.08)  H  18  05:30    


 


APTT  28.8 Seconds (25.1-36.5)   18  05:30    


 


Sodium  141 mmol/L (132-148)   18  05:30    


 


Potassium  4.8 mmol/L (3.6-5.0)   18  05:30    


 


Chloride  107 mmol/L ()   18  05:30    


 


Carbon Dioxide  28 mmol/L (21-33)   18  05:30    


 


Anion Gap  11  (10-20)   18  05:30    


 


BUN  17 mg/dL (7-21)   18  05:30    


 


Creatinine  0.9 mg/dl (0.8-1.5)   18  05:30    


 


Est GFR ( Amer)  > 60   18  05:30    


 


Est GFR (Non-Af Amer)  > 60   18  05:30    


 


POC Glucose (mg/dL)  105 mg/dL ()   18  10:59    


 


Random Glucose  92 mg/dL ()   18  05:30    


 


Hemoglobin A1c  6.1 % (4.2-6.5)   18  20:33    


 


Calcium  8.6 mg/dL (8.4-10.5)   18  05:30    


 


Magnesium  1.8 mg/dL (1.7-2.2)   18  15:49    


 


Total Bilirubin  0.5 mg/dL (0.2-1.3)   18  05:30    


 


AST  26 U/L (17-59)   18  05:30    


 


ALT  38 U/L (7-56)   18  05:30    


 


Alkaline Phosphatase  100 U/L ()   18  05:30    


 


Lactate Dehydrogenase  572 U/L (333-699)   18  15:49    


 


Total Creatine Kinase  219 U/L ()   18  15:49    


 


Troponin I  0.02 ng/mL D 18  11:30    


 


NT-Pro-B Natriuret Pep  230 pg/mL (0-450)   18  15:49    


 


Total Protein  6.4 g/dL (5.8-8.3)   18  05:30    


 


Albumin  3.3 g/dL (3.0-4.8)   18  05:30    


 


Globulin  3.1 gm/dL  18  05:30    


 


Albumin/Globulin Ratio  1.1  (1.1-1.8)   18  05:30    


 


Triglycerides  55 mg/dL ()   18  05:30    


 


Cholesterol  78 mg/dL (130-200)  L  18  05:30    


 


LDL Cholesterol Direct  40 mg/dL (0-129)   18  05:30    


 


HDL Cholesterol  32 mg/dL (29-60)   18  05:30    


 


TSH 3rd Generation  1.68 mIU/mL (0.46-4.68)   18  20:33    














Attending/Attestation





- Attestation


I have personally seen and examined this patient.: Yes


I have fully participated in the care of the patient.: Yes


I have reviewed all pertinent clinical information, including history, physical 

exam and plan: Yes


Notes (Text): 





Patient seen and examined. Agree with above


Symptoms of atypical chest pain - not attributed to acs


Cardio evaluation appreciated. Outpt f/u recommended